# Patient Record
Sex: FEMALE | Race: WHITE | NOT HISPANIC OR LATINO | URBAN - METROPOLITAN AREA
[De-identification: names, ages, dates, MRNs, and addresses within clinical notes are randomized per-mention and may not be internally consistent; named-entity substitution may affect disease eponyms.]

---

## 2017-07-28 ENCOUNTER — EMERGENCY (EMERGENCY)
Facility: HOSPITAL | Age: 72
LOS: 1 days | Discharge: HOME | End: 2017-07-28
Attending: EMERGENCY MEDICINE | Admitting: EMERGENCY MEDICINE
Payer: MEDICARE

## 2017-07-28 VITALS
DIASTOLIC BLOOD PRESSURE: 84 MMHG | HEART RATE: 74 BPM | SYSTOLIC BLOOD PRESSURE: 132 MMHG | RESPIRATION RATE: 16 BRPM | TEMPERATURE: 98 F | OXYGEN SATURATION: 98 %

## 2017-07-28 VITALS
HEART RATE: 74 BPM | OXYGEN SATURATION: 98 % | TEMPERATURE: 98 F | HEIGHT: 63 IN | RESPIRATION RATE: 14 BRPM | WEIGHT: 143.08 LBS

## 2017-07-28 DIAGNOSIS — O00.9 ECTOPIC PREGNANCY, UNSPECIFIED: Chronic | ICD-10-CM

## 2017-07-28 DIAGNOSIS — E11.9 TYPE 2 DIABETES MELLITUS WITHOUT COMPLICATIONS: ICD-10-CM

## 2017-07-28 DIAGNOSIS — Z96.60 PRESENCE OF UNSPECIFIED ORTHOPEDIC JOINT IMPLANT: Chronic | ICD-10-CM

## 2017-07-28 DIAGNOSIS — Z98.51 TUBAL LIGATION STATUS: Chronic | ICD-10-CM

## 2017-07-28 DIAGNOSIS — L03.116 CELLULITIS OF LEFT LOWER LIMB: ICD-10-CM

## 2017-07-28 DIAGNOSIS — I10 ESSENTIAL (PRIMARY) HYPERTENSION: ICD-10-CM

## 2017-07-28 DIAGNOSIS — Z98.89 OTHER SPECIFIED POSTPROCEDURAL STATES: Chronic | ICD-10-CM

## 2017-07-28 DIAGNOSIS — Z85.048 PERSONAL HISTORY OF OTHER MALIGNANT NEOPLASM OF RECTUM, RECTOSIGMOID JUNCTION, AND ANUS: ICD-10-CM

## 2017-07-28 LAB
ALBUMIN SERPL ELPH-MCNC: 3.7 G/DL — SIGNIFICANT CHANGE UP (ref 3.3–5)
ALP SERPL-CCNC: 69 U/L — SIGNIFICANT CHANGE UP (ref 40–120)
ALT FLD-CCNC: 27 U/L — SIGNIFICANT CHANGE UP (ref 12–78)
ANION GAP SERPL CALC-SCNC: 8 MMOL/L — SIGNIFICANT CHANGE UP (ref 5–17)
AST SERPL-CCNC: 21 U/L — SIGNIFICANT CHANGE UP (ref 15–37)
BASOPHILS NFR BLD AUTO: 1 % — SIGNIFICANT CHANGE UP (ref 0–2)
BILIRUB SERPL-MCNC: 0.2 MG/DL — SIGNIFICANT CHANGE UP (ref 0.2–1.2)
BUN SERPL-MCNC: 31 MG/DL — HIGH (ref 7–23)
CALCIUM SERPL-MCNC: 8.9 MG/DL — SIGNIFICANT CHANGE UP (ref 8.5–10.1)
CHLORIDE SERPL-SCNC: 105 MMOL/L — SIGNIFICANT CHANGE UP (ref 96–108)
CO2 SERPL-SCNC: 28 MMOL/L — SIGNIFICANT CHANGE UP (ref 22–31)
CREAT SERPL-MCNC: 1.3 MG/DL — SIGNIFICANT CHANGE UP (ref 0.5–1.3)
EOSINOPHIL NFR BLD AUTO: 2 % — SIGNIFICANT CHANGE UP (ref 0–6)
ERYTHROCYTE [SEDIMENTATION RATE] IN BLOOD: 20 MM/HR — SIGNIFICANT CHANGE UP (ref 0–20)
GLUCOSE SERPL-MCNC: 190 MG/DL — HIGH (ref 70–99)
HCT VFR BLD CALC: 35.1 % — SIGNIFICANT CHANGE UP (ref 34.5–45)
HGB BLD-MCNC: 11.7 G/DL — SIGNIFICANT CHANGE UP (ref 11.5–15.5)
LYMPHOCYTES # BLD AUTO: 18 % — SIGNIFICANT CHANGE UP (ref 13–44)
MCHC RBC-ENTMCNC: 30 PG — SIGNIFICANT CHANGE UP (ref 27–34)
MCHC RBC-ENTMCNC: 33.2 GM/DL — SIGNIFICANT CHANGE UP (ref 32–36)
MCV RBC AUTO: 90.3 FL — SIGNIFICANT CHANGE UP (ref 80–100)
MONOCYTES NFR BLD AUTO: 4 % — SIGNIFICANT CHANGE UP (ref 1–9)
NEUTROPHILS NFR BLD AUTO: 75 % — SIGNIFICANT CHANGE UP (ref 43–77)
PLATELET # BLD AUTO: 192 K/UL — SIGNIFICANT CHANGE UP (ref 150–400)
POTASSIUM SERPL-MCNC: 4.2 MMOL/L — SIGNIFICANT CHANGE UP (ref 3.5–5.3)
POTASSIUM SERPL-SCNC: 4.2 MMOL/L — SIGNIFICANT CHANGE UP (ref 3.5–5.3)
PROT SERPL-MCNC: 6.8 G/DL — SIGNIFICANT CHANGE UP (ref 6–8.3)
RBC # BLD: 3.89 M/UL — SIGNIFICANT CHANGE UP (ref 3.8–5.2)
RBC # FLD: 12.2 % — SIGNIFICANT CHANGE UP (ref 10.3–14.5)
SODIUM SERPL-SCNC: 141 MMOL/L — SIGNIFICANT CHANGE UP (ref 135–145)
WBC # BLD: 7.7 K/UL — SIGNIFICANT CHANGE UP (ref 3.8–10.5)
WBC # FLD AUTO: 7.7 K/UL — SIGNIFICANT CHANGE UP (ref 3.8–10.5)

## 2017-07-28 PROCEDURE — 85652 RBC SED RATE AUTOMATED: CPT

## 2017-07-28 PROCEDURE — 80053 COMPREHEN METABOLIC PANEL: CPT

## 2017-07-28 PROCEDURE — 85027 COMPLETE CBC AUTOMATED: CPT

## 2017-07-28 PROCEDURE — 96365 THER/PROPH/DIAG IV INF INIT: CPT

## 2017-07-28 PROCEDURE — 99284 EMERGENCY DEPT VISIT MOD MDM: CPT | Mod: 25

## 2017-07-28 PROCEDURE — 99284 EMERGENCY DEPT VISIT MOD MDM: CPT

## 2017-07-28 RX ORDER — CEFOXITIN 1 G/1
1 INJECTION, POWDER, FOR SOLUTION INTRAVENOUS
Qty: 20 | Refills: 0
Start: 2017-07-28 | End: 2017-08-07

## 2017-07-28 RX ORDER — MUPIROCIN 20 MG/G
1 OINTMENT TOPICAL
Qty: 1 | Refills: 0
Start: 2017-07-28 | End: 2017-08-07

## 2017-07-28 RX ORDER — CEFTRIAXONE 500 MG/1
1 INJECTION, POWDER, FOR SOLUTION INTRAMUSCULAR; INTRAVENOUS ONCE
Qty: 0 | Refills: 0 | Status: COMPLETED | OUTPATIENT
Start: 2017-07-28 | End: 2017-07-28

## 2017-07-28 RX ADMIN — CEFTRIAXONE 100 GRAM(S): 500 INJECTION, POWDER, FOR SOLUTION INTRAMUSCULAR; INTRAVENOUS at 13:00

## 2017-07-28 NOTE — ED ADULT NURSE NOTE - OBJECTIVE STATEMENT
Pt arrived ambulatory for left foot pain. Pt states she had a small cut on her foot for a week, and then 2 to 3 days ago it became very inflamed and painful. Pt denies any bleeding or discharge from the cut. Pt reports that pain is now radiating to the top of the foot, and it is very painful to walk on. Redness present around the area, pedal pulses palpable. Denies any numbness, tingling, fever, chills.

## 2017-07-28 NOTE — ED PROVIDER NOTE - CHPI ED SYMPTOMS NEG
no chills/no fever/no bleeding/no redness/no drainage/no pain/no purulent drainage/no bleeding at site

## 2017-07-28 NOTE — ED ADULT NURSE NOTE - PSH
Ectopic pregnancy    S/P hip replacement  right hip replacement  S/P tonsillectomy    Tubal ligation status

## 2017-07-28 NOTE — ED ADULT NURSE NOTE - PMH
Anal cancer    DM (diabetes mellitus)    Dysphagia    HTN (hypertension)    Hyperlipidemia    Osteoarthritis

## 2017-07-28 NOTE — ED PROVIDER NOTE - OBJECTIVE STATEMENT
came in ambulatory apparently accidentally cut left foot couple of days notice the past 2 days redness but no significant swelling went to PMD Dr Musa who send her to podiatry in turn send her to our er. Patient has history of DM HTN

## 2021-04-28 ENCOUNTER — INPATIENT (INPATIENT)
Facility: HOSPITAL | Age: 76
LOS: 1 days | Discharge: HOME | DRG: 69 | End: 2021-04-30
Attending: FAMILY MEDICINE | Admitting: FAMILY MEDICINE
Payer: MEDICARE

## 2021-04-28 VITALS
SYSTOLIC BLOOD PRESSURE: 199 MMHG | RESPIRATION RATE: 16 BRPM | TEMPERATURE: 98 F | DIASTOLIC BLOOD PRESSURE: 82 MMHG | OXYGEN SATURATION: 98 % | HEIGHT: 63 IN | HEART RATE: 77 BPM

## 2021-04-28 DIAGNOSIS — Z98.51 TUBAL LIGATION STATUS: Chronic | ICD-10-CM

## 2021-04-28 DIAGNOSIS — Z96.60 PRESENCE OF UNSPECIFIED ORTHOPEDIC JOINT IMPLANT: Chronic | ICD-10-CM

## 2021-04-28 DIAGNOSIS — Z98.89 OTHER SPECIFIED POSTPROCEDURAL STATES: Chronic | ICD-10-CM

## 2021-04-28 DIAGNOSIS — R41.82 ALTERED MENTAL STATUS, UNSPECIFIED: ICD-10-CM

## 2021-04-28 DIAGNOSIS — O00.9 ECTOPIC PREGNANCY, UNSPECIFIED: Chronic | ICD-10-CM

## 2021-04-28 LAB
ALBUMIN SERPL ELPH-MCNC: 3.6 G/DL — SIGNIFICANT CHANGE UP (ref 3.3–5)
ALP SERPL-CCNC: 113 U/L — SIGNIFICANT CHANGE UP (ref 40–120)
ALT FLD-CCNC: 38 U/L — SIGNIFICANT CHANGE UP (ref 12–78)
ANION GAP SERPL CALC-SCNC: 2 MMOL/L — LOW (ref 5–17)
APPEARANCE UR: CLEAR — SIGNIFICANT CHANGE UP
APTT BLD: 29.4 SEC — SIGNIFICANT CHANGE UP (ref 27.5–35.5)
AST SERPL-CCNC: 27 U/L — SIGNIFICANT CHANGE UP (ref 15–37)
BASOPHILS # BLD AUTO: 0.02 K/UL — SIGNIFICANT CHANGE UP (ref 0–0.2)
BASOPHILS NFR BLD AUTO: 0.3 % — SIGNIFICANT CHANGE UP (ref 0–2)
BILIRUB SERPL-MCNC: 0.2 MG/DL — SIGNIFICANT CHANGE UP (ref 0.2–1.2)
BILIRUB UR-MCNC: NEGATIVE — SIGNIFICANT CHANGE UP
BUN SERPL-MCNC: 29 MG/DL — HIGH (ref 7–23)
CALCIUM SERPL-MCNC: 8 MG/DL — LOW (ref 8.5–10.1)
CHLORIDE SERPL-SCNC: 104 MMOL/L — SIGNIFICANT CHANGE UP (ref 96–108)
CO2 SERPL-SCNC: 32 MMOL/L — HIGH (ref 22–31)
COLOR SPEC: SIGNIFICANT CHANGE UP
CREAT SERPL-MCNC: 1.4 MG/DL — HIGH (ref 0.5–1.3)
DIFF PNL FLD: NEGATIVE — SIGNIFICANT CHANGE UP
EOSINOPHIL # BLD AUTO: 0.13 K/UL — SIGNIFICANT CHANGE UP (ref 0–0.5)
EOSINOPHIL NFR BLD AUTO: 1.9 % — SIGNIFICANT CHANGE UP (ref 0–6)
GLUCOSE SERPL-MCNC: 168 MG/DL — HIGH (ref 70–99)
GLUCOSE UR QL: NEGATIVE — SIGNIFICANT CHANGE UP
HCT VFR BLD CALC: 35.1 % — SIGNIFICANT CHANGE UP (ref 34.5–45)
HGB BLD-MCNC: 11.5 G/DL — SIGNIFICANT CHANGE UP (ref 11.5–15.5)
IMM GRANULOCYTES NFR BLD AUTO: 0.3 % — SIGNIFICANT CHANGE UP (ref 0–1.5)
INR BLD: 1.01 RATIO — SIGNIFICANT CHANGE UP (ref 0.88–1.16)
KETONES UR-MCNC: NEGATIVE — SIGNIFICANT CHANGE UP
LACTATE SERPL-SCNC: 0.5 MMOL/L — LOW (ref 0.7–2)
LEUKOCYTE ESTERASE UR-ACNC: NEGATIVE — SIGNIFICANT CHANGE UP
LYMPHOCYTES # BLD AUTO: 0.96 K/UL — LOW (ref 1–3.3)
LYMPHOCYTES # BLD AUTO: 14 % — SIGNIFICANT CHANGE UP (ref 13–44)
MCHC RBC-ENTMCNC: 29.1 PG — SIGNIFICANT CHANGE UP (ref 27–34)
MCHC RBC-ENTMCNC: 32.8 GM/DL — SIGNIFICANT CHANGE UP (ref 32–36)
MCV RBC AUTO: 88.9 FL — SIGNIFICANT CHANGE UP (ref 80–100)
MONOCYTES # BLD AUTO: 0.64 K/UL — SIGNIFICANT CHANGE UP (ref 0–0.9)
MONOCYTES NFR BLD AUTO: 9.3 % — SIGNIFICANT CHANGE UP (ref 2–14)
NEUTROPHILS # BLD AUTO: 5.1 K/UL — SIGNIFICANT CHANGE UP (ref 1.8–7.4)
NEUTROPHILS NFR BLD AUTO: 74.2 % — SIGNIFICANT CHANGE UP (ref 43–77)
NITRITE UR-MCNC: NEGATIVE — SIGNIFICANT CHANGE UP
NRBC # BLD: 0 /100 WBCS — SIGNIFICANT CHANGE UP (ref 0–0)
PH UR: 7 — SIGNIFICANT CHANGE UP (ref 5–8)
PLATELET # BLD AUTO: 197 K/UL — SIGNIFICANT CHANGE UP (ref 150–400)
POTASSIUM SERPL-MCNC: 4.1 MMOL/L — SIGNIFICANT CHANGE UP (ref 3.5–5.3)
POTASSIUM SERPL-SCNC: 4.1 MMOL/L — SIGNIFICANT CHANGE UP (ref 3.5–5.3)
PROT SERPL-MCNC: 6.7 G/DL — SIGNIFICANT CHANGE UP (ref 6–8.3)
PROT UR-MCNC: 15
PROTHROM AB SERPL-ACNC: 11.8 SEC — SIGNIFICANT CHANGE UP (ref 10.6–13.6)
RBC # BLD: 3.95 M/UL — SIGNIFICANT CHANGE UP (ref 3.8–5.2)
RBC # FLD: 12.8 % — SIGNIFICANT CHANGE UP (ref 10.3–14.5)
SARS-COV-2 RNA SPEC QL NAA+PROBE: SIGNIFICANT CHANGE UP
SODIUM SERPL-SCNC: 138 MMOL/L — SIGNIFICANT CHANGE UP (ref 135–145)
SP GR SPEC: 1 — LOW (ref 1.01–1.02)
TSH SERPL-MCNC: 1.91 UIU/ML — SIGNIFICANT CHANGE UP (ref 0.36–3.74)
UROBILINOGEN FLD QL: NEGATIVE — SIGNIFICANT CHANGE UP
WBC # BLD: 6.87 K/UL — SIGNIFICANT CHANGE UP (ref 3.8–10.5)
WBC # FLD AUTO: 6.87 K/UL — SIGNIFICANT CHANGE UP (ref 3.8–10.5)

## 2021-04-28 PROCEDURE — 99285 EMERGENCY DEPT VISIT HI MDM: CPT | Mod: CS

## 2021-04-28 PROCEDURE — 0042T: CPT

## 2021-04-28 PROCEDURE — 71045 X-RAY EXAM CHEST 1 VIEW: CPT | Mod: 26

## 2021-04-28 PROCEDURE — 70498 CT ANGIOGRAPHY NECK: CPT | Mod: 26,MA

## 2021-04-28 PROCEDURE — 70496 CT ANGIOGRAPHY HEAD: CPT | Mod: 26,MA

## 2021-04-28 RX ORDER — ACETAMINOPHEN 500 MG
650 TABLET ORAL EVERY 6 HOURS
Refills: 0 | Status: DISCONTINUED | OUTPATIENT
Start: 2021-04-28 | End: 2021-04-30

## 2021-04-28 RX ORDER — ASPIRIN/CALCIUM CARB/MAGNESIUM 324 MG
325 TABLET ORAL DAILY
Refills: 0 | Status: DISCONTINUED | OUTPATIENT
Start: 2021-04-28 | End: 2021-04-28

## 2021-04-28 RX ORDER — DEXTROSE 50 % IN WATER 50 %
25 SYRINGE (ML) INTRAVENOUS ONCE
Refills: 0 | Status: DISCONTINUED | OUTPATIENT
Start: 2021-04-28 | End: 2021-04-30

## 2021-04-28 RX ORDER — ENOXAPARIN SODIUM 100 MG/ML
34 INJECTION SUBCUTANEOUS
Qty: 0 | Refills: 0 | DISCHARGE

## 2021-04-28 RX ORDER — INSULIN LISPRO 100/ML
VIAL (ML) SUBCUTANEOUS AT BEDTIME
Refills: 0 | Status: DISCONTINUED | OUTPATIENT
Start: 2021-04-28 | End: 2021-04-30

## 2021-04-28 RX ORDER — INSULIN LISPRO 100/ML
VIAL (ML) SUBCUTANEOUS
Refills: 0 | Status: DISCONTINUED | OUTPATIENT
Start: 2021-04-28 | End: 2021-04-30

## 2021-04-28 RX ORDER — GLUCAGON INJECTION, SOLUTION 0.5 MG/.1ML
1 INJECTION, SOLUTION SUBCUTANEOUS ONCE
Refills: 0 | Status: DISCONTINUED | OUTPATIENT
Start: 2021-04-28 | End: 2021-04-30

## 2021-04-28 RX ORDER — INSULIN NPH HUM/REG INSULIN HM 70-30/ML
0 VIAL (ML) SUBCUTANEOUS
Qty: 0 | Refills: 0 | DISCHARGE

## 2021-04-28 RX ORDER — LABETALOL HCL 100 MG
20 TABLET ORAL ONCE
Refills: 0 | Status: COMPLETED | OUTPATIENT
Start: 2021-04-28 | End: 2021-04-28

## 2021-04-28 RX ORDER — METFORMIN HYDROCHLORIDE 850 MG/1
2 TABLET ORAL
Qty: 0 | Refills: 0 | DISCHARGE

## 2021-04-28 RX ORDER — DEXTROSE 50 % IN WATER 50 %
15 SYRINGE (ML) INTRAVENOUS ONCE
Refills: 0 | Status: DISCONTINUED | OUTPATIENT
Start: 2021-04-28 | End: 2021-04-30

## 2021-04-28 RX ORDER — SODIUM CHLORIDE 9 MG/ML
2000 INJECTION INTRAMUSCULAR; INTRAVENOUS; SUBCUTANEOUS ONCE
Refills: 0 | Status: COMPLETED | OUTPATIENT
Start: 2021-04-28 | End: 2021-04-28

## 2021-04-28 RX ORDER — HYDRALAZINE HCL 50 MG
10 TABLET ORAL ONCE
Refills: 0 | Status: COMPLETED | OUTPATIENT
Start: 2021-04-28 | End: 2021-04-28

## 2021-04-28 RX ORDER — ESCITALOPRAM OXALATE 10 MG/1
1 TABLET, FILM COATED ORAL
Qty: 0 | Refills: 0 | DISCHARGE

## 2021-04-28 RX ORDER — HYDRALAZINE HCL 50 MG
10 TABLET ORAL EVERY 8 HOURS
Refills: 0 | Status: DISCONTINUED | OUTPATIENT
Start: 2021-04-28 | End: 2021-04-30

## 2021-04-28 RX ORDER — HEPARIN SODIUM 5000 [USP'U]/ML
5000 INJECTION INTRAVENOUS; SUBCUTANEOUS EVERY 12 HOURS
Refills: 0 | Status: DISCONTINUED | OUTPATIENT
Start: 2021-04-28 | End: 2021-04-29

## 2021-04-28 RX ORDER — ESCITALOPRAM OXALATE 10 MG/1
10 TABLET, FILM COATED ORAL DAILY
Refills: 0 | Status: DISCONTINUED | OUTPATIENT
Start: 2021-04-28 | End: 2021-04-28

## 2021-04-28 RX ORDER — AMLODIPINE BESYLATE 2.5 MG/1
10 TABLET ORAL EVERY 24 HOURS
Refills: 0 | Status: DISCONTINUED | OUTPATIENT
Start: 2021-04-28 | End: 2021-04-28

## 2021-04-28 RX ORDER — INSULIN GLARGINE 100 [IU]/ML
35 INJECTION, SOLUTION SUBCUTANEOUS AT BEDTIME
Refills: 0 | Status: DISCONTINUED | OUTPATIENT
Start: 2021-04-28 | End: 2021-04-30

## 2021-04-28 RX ORDER — ATORVASTATIN CALCIUM 80 MG/1
80 TABLET, FILM COATED ORAL AT BEDTIME
Refills: 0 | Status: DISCONTINUED | OUTPATIENT
Start: 2021-04-28 | End: 2021-04-28

## 2021-04-28 RX ORDER — ALENDRONATE SODIUM 70 MG/1
1 TABLET ORAL
Qty: 0 | Refills: 0 | DISCHARGE

## 2021-04-28 RX ORDER — DEXTROSE 50 % IN WATER 50 %
12.5 SYRINGE (ML) INTRAVENOUS ONCE
Refills: 0 | Status: DISCONTINUED | OUTPATIENT
Start: 2021-04-28 | End: 2021-04-30

## 2021-04-28 RX ORDER — LISINOPRIL 2.5 MG/1
1 TABLET ORAL
Qty: 0 | Refills: 0 | DISCHARGE

## 2021-04-28 RX ORDER — METOPROLOL TARTRATE 50 MG
25 TABLET ORAL
Refills: 0 | Status: DISCONTINUED | OUTPATIENT
Start: 2021-04-28 | End: 2021-04-28

## 2021-04-28 RX ADMIN — SODIUM CHLORIDE 2000 MILLILITER(S): 9 INJECTION INTRAMUSCULAR; INTRAVENOUS; SUBCUTANEOUS at 17:55

## 2021-04-28 RX ADMIN — Medication 10 MILLIGRAM(S): at 19:42

## 2021-04-28 RX ADMIN — HEPARIN SODIUM 5000 UNIT(S): 5000 INJECTION INTRAVENOUS; SUBCUTANEOUS at 23:05

## 2021-04-28 RX ADMIN — Medication 20 MILLIGRAM(S): at 18:59

## 2021-04-28 RX ADMIN — Medication 10 MILLIGRAM(S): at 23:05

## 2021-04-28 RX ADMIN — Medication 650 MILLIGRAM(S): at 23:38

## 2021-04-28 RX ADMIN — INSULIN GLARGINE 35 UNIT(S): 100 INJECTION, SOLUTION SUBCUTANEOUS at 23:05

## 2021-04-28 NOTE — ED PROVIDER NOTE - PROGRESS NOTE DETAILS
pt remains stable with confused speech.   made aware of results.  CT, CTA and CT perfusion with no evidence of CVA.  Discussed TPA, however,  is unsure.  Made aware of time constraint.  Neuro, Dr. Aquino, consulted Family declines TPA.  Dr. Aquino aware and recommends admission and MRI tomorrow

## 2021-04-28 NOTE — ED PROVIDER NOTE - OBJECTIVE STATEMENT
76 y/o F with hx of HTN, HLD, DM with AMS since approx 1:30 >3 hrs ago witnessed by .  pt unable to provide any hx due to confused speech.   states pt was having difficulty walking yesterday and today and had decreased PO intake.  Pt and  were outside working in the yard when pt became more confused.   thought it was due to blood sugar and gave pt something to drink.  pt with clear speech but difficult with word finding.  no blood thinners.  no hx of stroke    PCP: Joe Leslie

## 2021-04-28 NOTE — ED ADULT NURSE NOTE - OBJECTIVE STATEMENT
Patient is a 74yo female alerted and oriented x1 Patient is normally alerted and oriented x3 but has moment lapses into confusion. This particular lapse started at 1400. Patient family states that she has had altered mental status and weakness with difficulty ambulating

## 2021-04-28 NOTE — ED PROVIDER NOTE - ENMT NEGATIVE STATEMENT, MLM
3-5x/week Ears: no ear pain and no hearing problems. Nose: no nasal congestion and no nasal drainage. Mouth/Throat: no dysphagia, no hoarseness and no throat pain. Neck: no lumps, no pain, no stiffness and no swollen glands.

## 2021-04-28 NOTE — CHART NOTE - NSCHARTNOTEFT_GEN_A_CORE
Called by RN for new fever T 102.5F. Patient seen and evaluated at the bedside. Patient here for AMS, presented with aphasia, which has not changed interim.     Vital Signs Last 24 Hrs  T(C): 39.2 (28 Apr 2021 22:59), Max: 39.2 (28 Apr 2021 22:59)  T(F): 102.5 (28 Apr 2021 22:59), Max: 102.5 (28 Apr 2021 22:59)  HR: 76 (28 Apr 2021 22:59) (71 - 84)  BP: 219/93 (28 Apr 2021 22:59) (193/79 - 232/100)  RR: 22 (28 Apr 2021 22:59) (16 - 22)  SpO2: 98% (28 Apr 2021 22:59) (97% - 98%)    Physical Exam:  General:   HEENT:   Neck: Supple, nontender  CV: RRR, +S1/S2, no murmurs, rubs or gallops  Respiratory: CTA B/L, No W/R/R  Abdominal: Soft, NT, ND +BSx4  Extremities: No C/C/E, + peripheral pulses  Neurology: Awake,     A/P: 74 y/o female with PMHx HTN, HLD, DMT2 presented to ED earlier today with AMS, aphasia witnessed by  at home, admitted for HTN urgency and AMS workup, now with new onset fever   - New fever workup CBC w/ diff, CMP, lactate, procal, blood cultures x2  - UA on admission grossly clear, will order urine culture  - Fever possibly 2/2 aspiration event in setting of AMS vs CVA  - Further orders pending results of above Called by RN for new fever T 102.5F. Patient seen and evaluated at the bedside. Patient here for AMS, presented with aphasia, which has not changed interim.     Vital Signs Last 24 Hrs  T(C): 39.2 (28 Apr 2021 22:59), Max: 39.2 (28 Apr 2021 22:59)  T(F): 102.5 (28 Apr 2021 22:59), Max: 102.5 (28 Apr 2021 22:59)  HR: 76 (28 Apr 2021 22:59) (71 - 84)  BP: 219/93 (28 Apr 2021 22:59) (193/79 - 232/100)  RR: 22 (28 Apr 2021 22:59) (16 - 22)  SpO2: 98% (28 Apr 2021 22:59) (97% - 98%)    Physical Exam:  General:   HEENT:   Neck: Supple, nontender  CV: RRR, +S1/S2, no murmurs, rubs or gallops  Respiratory: CTA B/L, No W/R/R  Abdominal: Soft, NT, ND +BSx4  Extremities: No C/C/E, + peripheral pulses  Neurology: Awake,     A/P: 74 y/o female with PMHx HTN, HLD, DMT2 presented to ED earlier today with AMS, aphasia witnessed by  at home, admitted for HTN urgency and AMS workup, now with new onset fever   - New fever workup CBC w/ diff, CMP, lactate, procal, blood cultures x2, UA, urine culture  - Fever possibly 2/2 aspiration event in setting of AMS vs CVA  - Further orders pending results of above Called by RN for new fever T 102.5F. Patient seen and evaluated at the bedside. Patient here for AMS, presented with aphasia, which has not changed interim.     Vital Signs Last 24 Hrs  T(C): 39.2 (28 Apr 2021 22:59), Max: 39.2 (28 Apr 2021 22:59)  T(F): 102.5 (28 Apr 2021 22:59), Max: 102.5 (28 Apr 2021 22:59)  HR: 76 (28 Apr 2021 22:59) (71 - 84)  BP: 219/93 (28 Apr 2021 22:59) (193/79 - 232/100)  RR: 22 (28 Apr 2021 22:59) (16 - 22)  SpO2: 98% (28 Apr 2021 22:59) (97% - 98%)    Physical Exam:  General: Appears NAD  HEENT: NC/AT, EOMI b/l, moist mucous membranes   Neck: Supple, nontender  CV: RRR, +S1/S2, no murmurs, rubs or gallops  Respiratory: CTA B/L, no W/R/R  Abdominal: Soft, NTND, +bowel sounds present x4 quadrants   Extremities: No C/C/E, 2+ peripheral pulses  Neurology: Awake, does not follow commands, nonverbal, moves all 4 ext     A/P: 74 y/o female with PMHx HTN, HLD, DMT2 presented to ED earlier today with AMS, aphasia witnessed by  at home, admitted for HTN urgency and AMS workup, now with new onset fever   - New fever workup CBC w/ diff, CMP, lactate, procal, blood cultures x2, UA, urine culture  - Fever possibly 2/2 CVA vs aspiration event in setting of AMS  - Further orders pending results of above Called by RN for new fever T 102.5F. Patient seen and evaluated at the bedside. Patient here for AMS, presented with aphasia, which has not changed interim.     Vital Signs Last 24 Hrs  T(C): 39.2 (28 Apr 2021 22:59), Max: 39.2 (28 Apr 2021 22:59)  T(F): 102.5 (28 Apr 2021 22:59), Max: 102.5 (28 Apr 2021 22:59)  HR: 76 (28 Apr 2021 22:59) (71 - 84)  BP: 219/93 (28 Apr 2021 22:59) (193/79 - 232/100)  RR: 22 (28 Apr 2021 22:59) (16 - 22)  SpO2: 98% (28 Apr 2021 22:59) (97% - 98%)    Physical Exam:  General: Appears NAD  HEENT: NC/AT, PERRLA, moist mucous membranes   Neck: Supple, nontender  CV: RRR, +S1/S2, no murmurs, rubs or gallops  Respiratory: CTA B/L, no W/R/R  Abdominal: Soft, NTND, +bowel sounds present x4 quadrants   Extremities: No C/C/E, 2+ peripheral pulses  Neurology: Awake, does not follow commands, nonverbal, moves all 4 ext     A/P: 76 y/o female with PMHx HTN, HLD, DMT2 presented to ED earlier today with AMS, aphasia witnessed by  at home, admitted for HTN urgency and AMS workup, now with new onset fever   - New fever workup CBC w/ diff, CMP, lactate, procal, blood cultures x2, UA, urine culture  - Fever possibly 2/2 CVA vs aspiration event in setting of AMS  - Further orders pending results of above

## 2021-04-28 NOTE — H&P ADULT - HISTORY OF PRESENT ILLNESS
Chart, labs and XR reports reviewed. Chart, labs and XR reports reviewed.  ADITYA CHAVEZ is a 75y Female brought to ED AMS since 1:30 PM  >3 hrs ago witnessed by .  Patient unable to provide any histrory due to confusion.   states she was having difficulty in walking yesterday and today and she had decreased oral intake.  Patient and  were outside working in the yard when patient became more confused.   thought it was due to blood sugar and gave her something to drink.  Patient with clear speech but difficult with word finding.  Not on blood thinners.  No headache no nausea or vomiting.

## 2021-04-28 NOTE — H&P ADULT - NEGATIVE NEUROLOGICAL SYMPTOMS
no transient paralysis/no generalized seizures/no focal seizures/no syncope/no tremors/no headache/no loss of consciousness/no hemiparesis

## 2021-04-28 NOTE — H&P ADULT - NSICDXPASTSURGICALHX_GEN_ALL_CORE_FT
PAST SURGICAL HISTORY:  Ectopic pregnancy     S/P hip replacement right hip replacement    S/P tonsillectomy     Tubal ligation status

## 2021-04-28 NOTE — H&P ADULT - NSHPLABSRESULTS_GEN_ALL_CORE
11.5   6.87  )-----------( 197      ( 2021 17:40 )             35.1     2021 17:40    138    |  104    |  29     ----------------------------<  168    4.1     |  32     |  1.40     Ca    8.0        2021 17:40    TPro  6.7    /  Alb  3.6    /  TBili  0.2    /  DBili  x      /  AST  27     /  ALT  38     /  AlkPhos  113    2021 17:40    LIVER FUNCTIONS - ( 2021 17:40 )  Alb: 3.6 g/dL / Pro: 6.7 g/dL / ALK PHOS: 113 U/L / ALT: 38 U/L / AST: 27 U/L / GGT: x           PT/INR - ( 2021 17:40 )   PT: 11.8 sec;   INR: 1.01 ratio      PTT - ( 2021 17:40 )  PTT:29.4 sec  CAPILLARY BLOOD GLUCOSE    POCT Blood Glucose.: 199 mg/dL (2021 16:37)  POCT Blood Glucose.: 181 mg/dL (2021 16:14)    Urinalysis Basic - ( 2021 20:19 )    Color: Pale Yellow / Appearance: Clear / S.005 / pH: x  Gluc: x / Ketone: Negative  / Bili: Negative / Urobili: Negative   Blood: x / Protein: 15 / Nitrite: Negative   Leuk Esterase: Negative / RBC: 0-2 /HPF / WBC 0-2   Sq Epi: x / Non Sq Epi: Few / Bacteria: Occasional    < from: CT Brain Stroke Protocol (21 @ 16:58) >    HEAD CT: No acute intracranial hemorrhage or acute territorial infarction.    CT PERFUSION demonstrated: No asymmetric or territorial perfusion abnormality.    If symptoms persist consider follow up head CT or MRI, MRA  if no contraindication.    CTA COW:  Patent intracranial circulation without flow limiting stenosis.    CTA NECK: Moderate stenosis of the right internal carotid artery.    < end of copied text >    < from: CT Angio Neck w/ IV Cont (21 @ 17:09) >    HEAD CT AND RAPID PERFUSION:    HEAD CT: No acute intracranial hemorrhage or acute territorial infarction.    CT PERFUSION demonstrated: No asymmetric or territorial perfusion abnormality.    If symptoms persist consider follow up head CT or MRI, MRA  if no contraindication.    CTA COW:  Patent intracranial circulation without flow limiting stenosis.    CTA NECK: Moderate stenosis of the right internal carotid artery.      < end of copied text >

## 2021-04-28 NOTE — ED ADULT NURSE NOTE - NSIMPLEMENTINTERV_GEN_ALL_ED
Implemented All Fall with Harm Risk Interventions:  [place] to call system. Call bell, personal items and telephone within reach. Instruct patient to call for assistance. Room bathroom lighting operational. Non-slip footwear when patient is off stretcher. Physically safe environment: no spills, clutter or unnecessary equipment. Stretcher in lowest position, wheels locked, appropriate side rails in place. Provide visual cue, wrist band, yellow gown, etc. Monitor gait and stability. Monitor for mental status changes and reorient to person, place, and time. Review medications for side effects contributing to fall risk. Reinforce activity limits and safety measures with patient and family. Provide visual clues: red socks.

## 2021-04-28 NOTE — H&P ADULT - NSICDXFAMILYHX_GEN_ALL_CORE_FT
FAMILY HISTORY:  Father  Still living? No  Family history of prostate cancer, Age at diagnosis: Age Unknown    Mother  Still living? No  Family history of diabetes mellitus (DM), Age at diagnosis: Age Unknown  Family history of hypertension, Age at diagnosis: Age Unknown  Family history of prostate cancer, Age at diagnosis: Age Unknown

## 2021-04-28 NOTE — ED ADULT TRIAGE NOTE - CHIEF COMPLAINT QUOTE
pt from home, working in yard for a few hours,  reports ams starting at 1400, confused conversation, generalized weakness,  reports not eating and drinking well,  ems glucose 235, no facial asymmetry noted, hands equal , speech clear

## 2021-04-28 NOTE — ED ADULT NURSE NOTE - NSSUHOSCREENINGYN_ED_ALL_ED
Pt denies allergy, rx sent to pharm   No - the patient is unable to be screened due to medical condition

## 2021-04-28 NOTE — ED ADULT NURSE REASSESSMENT NOTE - NS ED NURSE REASSESS COMMENT FT1
Pt received lying on stretcher with family at bedside. Pt is restless, unable to fully express herself, moving all extremities with equal strength. Pt noted with >300 ml on bladder scan, d/w Dr. Mckeon, urinary catheter inserted with return of pale yellow urine

## 2021-04-28 NOTE — H&P ADULT - NSICDXPASTMEDICALHX_GEN_ALL_CORE_FT
PAST MEDICAL HISTORY:  Anal cancer     DM (diabetes mellitus)     Dysphagia     HTN (hypertension)     Hyperlipidemia     Osteoarthritis

## 2021-04-28 NOTE — ED PROVIDER NOTE - CARE PLAN
Principal Discharge DX:	Altered mental status, unspecified altered mental status type   Principal Discharge DX:	Altered mental status, unspecified altered mental status type  Secondary Diagnosis:	Hypertensive urgency

## 2021-04-29 DIAGNOSIS — I10 ESSENTIAL (PRIMARY) HYPERTENSION: ICD-10-CM

## 2021-04-29 DIAGNOSIS — R41.82 ALTERED MENTAL STATUS, UNSPECIFIED: ICD-10-CM

## 2021-04-29 DIAGNOSIS — E11.65 TYPE 2 DIABETES MELLITUS WITH HYPERGLYCEMIA: ICD-10-CM

## 2021-04-29 LAB
A1C WITH ESTIMATED AVERAGE GLUCOSE RESULT: 7.7 % — HIGH (ref 4–5.6)
ALBUMIN SERPL ELPH-MCNC: 4 G/DL — SIGNIFICANT CHANGE UP (ref 3.3–5)
ALP SERPL-CCNC: 82 U/L — SIGNIFICANT CHANGE UP (ref 40–120)
ALT FLD-CCNC: 35 U/L — SIGNIFICANT CHANGE UP (ref 12–78)
ANION GAP SERPL CALC-SCNC: 9 MMOL/L — SIGNIFICANT CHANGE UP (ref 5–17)
APPEARANCE UR: CLEAR — SIGNIFICANT CHANGE UP
AST SERPL-CCNC: 27 U/L — SIGNIFICANT CHANGE UP (ref 15–37)
BASOPHILS # BLD AUTO: 0.02 K/UL — SIGNIFICANT CHANGE UP (ref 0–0.2)
BASOPHILS NFR BLD AUTO: 0.2 % — SIGNIFICANT CHANGE UP (ref 0–2)
BILIRUB SERPL-MCNC: 0.5 MG/DL — SIGNIFICANT CHANGE UP (ref 0.2–1.2)
BILIRUB UR-MCNC: NEGATIVE — SIGNIFICANT CHANGE UP
BUN SERPL-MCNC: 24 MG/DL — HIGH (ref 7–23)
CALCIUM SERPL-MCNC: 8.8 MG/DL — SIGNIFICANT CHANGE UP (ref 8.5–10.1)
CHLORIDE SERPL-SCNC: 106 MMOL/L — SIGNIFICANT CHANGE UP (ref 96–108)
CHOLEST SERPL-MCNC: 194 MG/DL — SIGNIFICANT CHANGE UP
CO2 SERPL-SCNC: 25 MMOL/L — SIGNIFICANT CHANGE UP (ref 22–31)
COLOR SPEC: YELLOW — SIGNIFICANT CHANGE UP
COVID-19 SPIKE DOMAIN AB INTERP: POSITIVE
COVID-19 SPIKE DOMAIN ANTIBODY RESULT: >250 U/ML — HIGH
CREAT SERPL-MCNC: 1.4 MG/DL — HIGH (ref 0.5–1.3)
CULTURE RESULTS: NO GROWTH — SIGNIFICANT CHANGE UP
DIFF PNL FLD: ABNORMAL
EOSINOPHIL # BLD AUTO: 0.01 K/UL — SIGNIFICANT CHANGE UP (ref 0–0.5)
EOSINOPHIL NFR BLD AUTO: 0.1 % — SIGNIFICANT CHANGE UP (ref 0–6)
ESTIMATED AVERAGE GLUCOSE: 174 MG/DL — HIGH (ref 68–114)
FOLATE SERPL-MCNC: >20 NG/ML — SIGNIFICANT CHANGE UP
GLUCOSE SERPL-MCNC: 247 MG/DL — HIGH (ref 70–99)
GLUCOSE UR QL: 100 MG/DL
HCT VFR BLD CALC: 37.2 % — SIGNIFICANT CHANGE UP (ref 34.5–45)
HCV AB S/CO SERPL IA: 0.09 S/CO — SIGNIFICANT CHANGE UP (ref 0–0.99)
HCV AB SERPL-IMP: SIGNIFICANT CHANGE UP
HDLC SERPL-MCNC: 49 MG/DL — LOW
HGB BLD-MCNC: 12.2 G/DL — SIGNIFICANT CHANGE UP (ref 11.5–15.5)
IMM GRANULOCYTES NFR BLD AUTO: 0.5 % — SIGNIFICANT CHANGE UP (ref 0–1.5)
KETONES UR-MCNC: NEGATIVE — SIGNIFICANT CHANGE UP
LACTATE SERPL-SCNC: 2.3 MMOL/L — HIGH (ref 0.7–2)
LEUKOCYTE ESTERASE UR-ACNC: NEGATIVE — SIGNIFICANT CHANGE UP
LIPID PNL WITH DIRECT LDL SERPL: 110 MG/DL — HIGH
LYMPHOCYTES # BLD AUTO: 0.75 K/UL — LOW (ref 1–3.3)
LYMPHOCYTES # BLD AUTO: 6.4 % — LOW (ref 13–44)
MCHC RBC-ENTMCNC: 28.8 PG — SIGNIFICANT CHANGE UP (ref 27–34)
MCHC RBC-ENTMCNC: 32.8 GM/DL — SIGNIFICANT CHANGE UP (ref 32–36)
MCV RBC AUTO: 87.9 FL — SIGNIFICANT CHANGE UP (ref 80–100)
MONOCYTES # BLD AUTO: 0.23 K/UL — SIGNIFICANT CHANGE UP (ref 0–0.9)
MONOCYTES NFR BLD AUTO: 2 % — SIGNIFICANT CHANGE UP (ref 2–14)
NEUTROPHILS # BLD AUTO: 10.7 K/UL — HIGH (ref 1.8–7.4)
NEUTROPHILS NFR BLD AUTO: 90.8 % — HIGH (ref 43–77)
NITRITE UR-MCNC: NEGATIVE — SIGNIFICANT CHANGE UP
NON HDL CHOLESTEROL: 145 MG/DL — HIGH
NRBC # BLD: 0 /100 WBCS — SIGNIFICANT CHANGE UP (ref 0–0)
PH UR: 5 — SIGNIFICANT CHANGE UP (ref 5–8)
PLATELET # BLD AUTO: 194 K/UL — SIGNIFICANT CHANGE UP (ref 150–400)
POTASSIUM SERPL-MCNC: 3.8 MMOL/L — SIGNIFICANT CHANGE UP (ref 3.5–5.3)
POTASSIUM SERPL-SCNC: 3.8 MMOL/L — SIGNIFICANT CHANGE UP (ref 3.5–5.3)
PROCALCITONIN SERPL-MCNC: <0.05 NG/ML — HIGH (ref 0–0.04)
PROT SERPL-MCNC: 7.5 G/DL — SIGNIFICANT CHANGE UP (ref 6–8.3)
PROT UR-MCNC: 100
RAPID RVP RESULT: SIGNIFICANT CHANGE UP
RBC # BLD: 4.23 M/UL — SIGNIFICANT CHANGE UP (ref 3.8–5.2)
RBC # FLD: 12.9 % — SIGNIFICANT CHANGE UP (ref 10.3–14.5)
SARS-COV-2 IGG+IGM SERPL QL IA: >250 U/ML — HIGH
SARS-COV-2 IGG+IGM SERPL QL IA: POSITIVE
SARS-COV-2 RNA SPEC QL NAA+PROBE: SIGNIFICANT CHANGE UP
SODIUM SERPL-SCNC: 140 MMOL/L — SIGNIFICANT CHANGE UP (ref 135–145)
SP GR SPEC: 1.02 — SIGNIFICANT CHANGE UP (ref 1.01–1.02)
SPECIMEN SOURCE: SIGNIFICANT CHANGE UP
TRIGL SERPL-MCNC: 172 MG/DL — HIGH
UROBILINOGEN FLD QL: NEGATIVE — SIGNIFICANT CHANGE UP
VIT B12 SERPL-MCNC: >2000 PG/ML — HIGH (ref 232–1245)
WBC # BLD: 11.77 K/UL — HIGH (ref 3.8–10.5)
WBC # FLD AUTO: 11.77 K/UL — HIGH (ref 3.8–10.5)

## 2021-04-29 PROCEDURE — 99223 1ST HOSP IP/OBS HIGH 75: CPT

## 2021-04-29 PROCEDURE — 70450 CT HEAD/BRAIN W/O DYE: CPT | Mod: 26

## 2021-04-29 PROCEDURE — 71250 CT THORAX DX C-: CPT | Mod: 26

## 2021-04-29 PROCEDURE — 99221 1ST HOSP IP/OBS SF/LOW 40: CPT | Mod: GC

## 2021-04-29 PROCEDURE — 70553 MRI BRAIN STEM W/O & W/DYE: CPT | Mod: 26

## 2021-04-29 PROCEDURE — 99497 ADVNCD CARE PLAN 30 MIN: CPT

## 2021-04-29 PROCEDURE — 93306 TTE W/DOPPLER COMPLETE: CPT | Mod: 26

## 2021-04-29 PROCEDURE — 93010 ELECTROCARDIOGRAM REPORT: CPT

## 2021-04-29 RX ORDER — METOPROLOL TARTRATE 50 MG
1.25 TABLET ORAL EVERY 6 HOURS
Refills: 0 | Status: DISCONTINUED | OUTPATIENT
Start: 2021-04-29 | End: 2021-04-30

## 2021-04-29 RX ORDER — HYDRALAZINE HCL 50 MG
10 TABLET ORAL ONCE
Refills: 0 | Status: DISCONTINUED | OUTPATIENT
Start: 2021-04-29 | End: 2021-04-30

## 2021-04-29 RX ORDER — ATORVASTATIN CALCIUM 80 MG/1
80 TABLET, FILM COATED ORAL AT BEDTIME
Refills: 0 | Status: DISCONTINUED | OUTPATIENT
Start: 2021-04-29 | End: 2021-04-30

## 2021-04-29 RX ORDER — HEPARIN SODIUM 5000 [USP'U]/ML
5000 INJECTION INTRAVENOUS; SUBCUTANEOUS EVERY 8 HOURS
Refills: 0 | Status: DISCONTINUED | OUTPATIENT
Start: 2021-04-29 | End: 2021-04-30

## 2021-04-29 RX ORDER — ASPIRIN/CALCIUM CARB/MAGNESIUM 324 MG
300 TABLET ORAL DAILY
Refills: 0 | Status: DISCONTINUED | OUTPATIENT
Start: 2021-04-29 | End: 2021-04-30

## 2021-04-29 RX ADMIN — HEPARIN SODIUM 5000 UNIT(S): 5000 INJECTION INTRAVENOUS; SUBCUTANEOUS at 21:10

## 2021-04-29 RX ADMIN — Medication 4: at 12:31

## 2021-04-29 RX ADMIN — Medication 650 MILLIGRAM(S): at 05:49

## 2021-04-29 RX ADMIN — HEPARIN SODIUM 5000 UNIT(S): 5000 INJECTION INTRAVENOUS; SUBCUTANEOUS at 13:20

## 2021-04-29 RX ADMIN — Medication 1.25 MILLIGRAM(S): at 17:10

## 2021-04-29 RX ADMIN — Medication 4: at 05:31

## 2021-04-29 RX ADMIN — Medication 1 MILLIGRAM(S): at 11:53

## 2021-04-29 RX ADMIN — Medication 1.25 MILLIGRAM(S): at 12:22

## 2021-04-29 RX ADMIN — HEPARIN SODIUM 5000 UNIT(S): 5000 INJECTION INTRAVENOUS; SUBCUTANEOUS at 05:29

## 2021-04-29 RX ADMIN — Medication 1.25 MILLIGRAM(S): at 23:19

## 2021-04-29 RX ADMIN — ATORVASTATIN CALCIUM 80 MILLIGRAM(S): 80 TABLET, FILM COATED ORAL at 21:12

## 2021-04-29 RX ADMIN — Medication 300 MILLIGRAM(S): at 12:22

## 2021-04-29 RX ADMIN — INSULIN GLARGINE 35 UNIT(S): 100 INJECTION, SOLUTION SUBCUTANEOUS at 21:11

## 2021-04-29 NOTE — CONSULT NOTE ADULT - ATTENDING COMMENTS
I was physically present for the key portions of the evaluation and management (E/M) service provided.  I agree with the above history, physical, and plan, which I have reviewed and edited where appropriate.     Pt is altered. Unable to provide meaningful information. No signs of significant ischemia or volume overload. EKG without ischemic changes. HTN urgency. given possible cva allow for permissive htn. will cont with IV hydralazine and lopressor IV with PRN dosing. monitor bp closely.   cont asa  neuro fu  check echo  monitor tele for occult af  Monitor and replete electrolytes. Keep K>4.0 and Mg>2.0.  Further cardiac workup will depend on clinical course.

## 2021-04-29 NOTE — DIETITIAN INITIAL EVALUATION ADULT. - NSPROEDAREADYLEARN_GEN_A_NUR
Pt noted with elevated HgbA1c and lipid panel however education not appropriate at this time, will remain available and f/u with education if appropriate./acuteness of illness

## 2021-04-29 NOTE — SPEECH LANGUAGE PATHOLOGY EVALUATION - COMMENTS
Consult received and chart reviewed. Attempted speech and language assessment this PM. Upon arrival, nursing assistant reported pt is s/p ativan for MRI administered earlier today and has been significantly lethargic/minimally arousable. RN confirmed report and is in agreement to hold speech and language assessment at this time. Will f/u when pt is able to actively participate in assessment. Called out to MD.

## 2021-04-29 NOTE — CONSULT NOTE ADULT - SUBJECTIVE AND OBJECTIVE BOX
[facility] DIVISION of INFECTIOUS DISEASE  Roscoe Zaman MD PhD, Glory Chapa MD, Dori Howard MD, Bernton Wolfe MD  and providing coverage with Ana Manzano MD and Stew Joshi MD  Providing Infectious Disease Consultations at [facility], [facility], [facility]'s    Office# [phone] to schedule follow up appointments  Answering Service for urgent calls or New Consults [phone]  Cell# to text for urgent issues Roscoe Austyn [phone]     HPI:  75y Female brought to ED AMS abrupt onset since 1:30 PM   witnessed by .  Patient unable to provide any history due to confusion.   states she was having difficulty in walkingthe day PTA andthen had decreased oral intake.  Patient and  were outside working in the yard when patient became more confused.   thought it was due to blood sugar and gave her something to drink.  Patient with clear speech but difficult with word finding initially then AMS progressed to be more profound.  Not on blood thinners.  No headache no nausea or vomiting.      PAST MEDICAL & SURGICAL HISTORY:  HTN (hypertension)  Hyperlipidemia  DM (diabetes mellitus)  Osteoarthritis  Anal cancer  Dysphagia  Tubal ligation status  Ectopic pregnancy    S/P hip replacement  right hip replacement  S/P tonsillectomy    Antimicrobials      Immunological      Other  acetaminophen  Suppository .. 650 milliGRAM(s) Rectal every 6 hours PRN  aspirin Suppository 300 milliGRAM(s) Rectal daily  atorvastatin 80 milliGRAM(s) Oral at bedtime  dextrose 40% Gel 15 Gram(s) Oral once  dextrose 50% Injectable 25 Gram(s) IV Push once  dextrose 50% Injectable 12.5 Gram(s) IV Push once  dextrose 50% Injectable 25 Gram(s) IV Push once  glucagon  Injectable 1 milliGRAM(s) IntraMuscular once  heparin   Injectable 5000 Unit(s) SubCutaneous every 8 hours  hydrALAZINE Injectable 10 milliGRAM(s) IV Push every 8 hours PRN  hydrALAZINE Injectable 10 milliGRAM(s) IV Push once  insulin glargine Injectable (LANTUS) 35 Unit(s) SubCutaneous at bedtime  insulin lispro (ADMELOG) corrective regimen sliding scale   SubCutaneous three times a day before meals  insulin lispro (ADMELOG) corrective regimen sliding scale   SubCutaneous at bedtime  metoprolol tartrate Injectable 1.25 milliGRAM(s) IV Push every 6 hours      Allergies    morphine (Pruritus)    Intolerances        SOCIAL HISTORY:  no toxic habits reported      FAMILY HISTORY:  Family history of prostate cancer (Father, Mother)  Family history of diabetes mellitus (DM) (Mother)  Family history of hypertension (Mother)        ROS:  limited due to cognitive status    Vital Signs Last 24 Hrs  T(C): 37.7 (2021 09:59), Max: 39.2 (2021 22:59)  T(F): 99.8 (2021 09:59), Max: 102.5 (2021 22:59)  HR: 78 (2021 09:59) (71 - 84)  BP: 147/76 (2021 09:59) (147/67 - 232/100)  BP(mean): --  RR: 18 (2021 09:59) (16 - 22)  SpO2: 97% (2021 09:59) (96% - 98%)    PE:  WDWN in no distress  HEENT:  NC, PERRL, sclerae anicteric, conjunctivae clear, EOMI.  Sinuses nontender, no nasal exudate.  No buccal or pharyngeal lesions, erythema or exudate  Neck:  Supple, no adenopathy  Lungs:  No accessory muscle use, bilaterally clear to auscultation  Cor: distant  Abd:  Symmetric, normoactive BS.  Soft, nontender, no masses, guarding or rebound.  Liver and spleen not enlarged  Extrem:  No cyanosis or edema  Skin:  No rashes.  Neuro: nonverbal, not following commands        LABS:                        12.2   11.77 )-----------( 194      ( 2021 00:00 )             37.2       WBC Count: 11.77 K/uL (21 @ 00:00)  WBC Count: 6.87 K/uL (21 @ 17:40)          140  |  106  |  24<H>  ----------------------------<  247<H>  3.8   |  25  |  1.40<H>    Ca    8.8      2021 00:00    TPro  7.5  /  Alb  4.0  /  TBili  0.5  /  DBili  x   /  AST  27  /  ALT  35  /  AlkPhos  82        Creatinine, Serum: 1.40 mg/dL (21 @ 00:00)  Creatinine, Serum: 1.40 mg/dL (21 @ 17:40)      Urinalysis Basic - ( 2021 07:24 )    Color: Yellow / Appearance: Clear / S.020 / pH: x  Gluc: x / Ketone: Negative  / Bili: Negative / Urobili: Negative   Blood: x / Protein: 100 / Nitrite: Negative   Leuk Esterase: Negative / RBC: 0-2 /HPF / WBC 3-5   Sq Epi: x / Non Sq Epi: Occasional / Bacteria: Moderate      MICROBIOLOGY:      RADIOLOGY & ADDITIONAL STUDIES:    --< from: MR Head w/wo IV Cont (21 @ 12:11) >    EXAM:  MR BRAIN WAW IC                            PROCEDURE DATE:  2021          INTERPRETATION:  MRI brain with and without contrast    History altered mental status and right-sided weakness    Contrast Gadavist 6.5 cc; 1 cc discarded    Comparison CT performed 21  Image quality is somewhat limited by motion artifact  There is no enhancing mass lesion. There is mild central volume loss and chronic microvascular ischemic change, typical for age without mass effect, cortical edema orhydrocephalus. There is no evidence of acute infarct or previous parenchymal hemorrhage. The orbital and sellar contents and cerebellar tonsils are within normal limits.    IMPRESSION:  Normal for age

## 2021-04-29 NOTE — DIETITIAN INITIAL EVALUATION ADULT. - OTHER INFO
As per chart pt is a 75 year old female with a PMH of Diabetes, Dysphagia, HTN, HLD, admitted with AMS, HTN urgency.     Pt seen at bedside, noted to be non-verbal unable to participate in interview at this time. Pt is currently NPO pending SLP eval. Noted with hx of DM, on Metformin, Lantus, Novolin PTA, current HgbA1c 7.7%, slightly elevated. Pt's admission weight 149.11lbs, current weight per chart (4/29) 149.6lbs. Pt appears appropriately nourished for age. No GI distress noted at this time, no BM since admission.     No pressure injuries noted at this time As per chart pt is a 75 year old female with a PMH of Diabetes, Dysphagia, HTN, HLD, admitted with AMS, HTN urgency.     Pt seen at bedside, noted to be non-verbal unable to participate in interview at this time. Additional information obtained from pt's  over the phone. Pt is currently NPO pending SLP eval. Pt listed with hx of dysphagia however  has no recall of this. Noted with hx of DM, on Metformin, Lantus, Novolin PTA, current HgbA1c 7.7%, slightly elevated. Pt's admission weight 149.11lbs, current weight per chart (4/29) 149.6lbs. Pt's  reports pt's current weight of 150lbs, states that her weight has increased over the past several months and her "stomach appears larger". No GI distress noted at this time, no BM since admission.     No pressure injuries noted at this time

## 2021-04-29 NOTE — GOALS OF CARE CONVERSATION - ADVANCED CARE PLANNING - CONVERSATION DETAILS
Writer met with patient spouse, Sid.  Patient returned from MRI, pt sleeping, was informed pt was medicated with Ativan prior to MRI.   Reviewed patient's medical and social history as well as events leading to patient's hospitalization, pt with some short term memory loss, with a little difficulty walking . Writer discussed patient's current diagnosis ( AMS, work up for CVA HTN urgency, DM poor control.), Spouse spoke of pt controlling her DM with po and insulin coverage, has seen endocrinologist.  Patients medical condition and management,  uncertain prognosis. Inquired about patient's wishes regarding extent of medical care to be provided including escalation of medical care into the ICU and use of vasopressor support. In addition, the writer inquired about thoughts regarding cardiopulmonary resuscitation, artificial nutrition and hydration including use of feeding tubes and IVF, antibiotics.  Spouse showed needs more information regarding results of MRI, and if symptoms will resolve.   All questions answered. Contact # for MD given.  Pt has a HCP, spouse, no directives in place at this time.  Psychosocial support provided.  PC RN contact # given , will follow.

## 2021-04-29 NOTE — ED ADULT NURSE REASSESSMENT NOTE - NS ED NURSE REASSESS COMMENT FT1
Received critical lactate 2.3, paged MD Dutton, awaiting response. Primary RN Kory cao. Received critical lactate 2.3, paged MD Dutton, awaiting response. Primary RN Kory cao. House doctor MD Espitia made aware of lactate.

## 2021-04-29 NOTE — CONSULT NOTE ADULT - SUBJECTIVE AND OBJECTIVE BOX
TIME:  9 a.m.    REASON FOR CONSULTATION:  Altered mental status.    Information obtained from the spouse.    HISTORY OF PRESENT ILLNESS:  The patient is a 75-year-old female with a past medical history, spouse has noted recently that she is becoming more forgetful, had an episode on Tuesday where she was sitting on the stairs, stated that she felt weak.  She could not walk.  They were able to get her up and ambulate her.  Then again once they had a similar event that she felt weak in her legs, but they were able to ambulate her.  They noticed that she started to become more disoriented, having problems expressing herself.  Other than that has been in a normal state of health, did not have any other complaints.  I was asked to evaluate secondary to an episode of altered mental status.    PAST MEDICAL HISTORY:  Hypertension, hyperlipidemia, diabetes, anal cancer for which she did receive radiation and chemo.    PAST SURGICAL HISTORY:  Hip.  No deficits.    MEDICATIONS:  On the outside are lisinopril 20 mg once a day, escitalopram 5 mg once a day, Lipitor 20 mg at bedtime, and Lantus.    SOCIAL HISTORY:  No current tobacco.  No current alcohol.    FAMILY HISTORY:  Mother and aunt both had dementia.    ALLERGIES:  MORPHINE.    REVIEW OF SYSTEMS:  Extremely limited secondary to the patient having severe expressive aphasia, but as per my conversation with the spouse had been in a normal state of health.  No history of headaches or fevers but was complaining of difficulty ambulating, weakness in her legs.    PHYSICAL EXAMINATION:  VITAL SIGNS:  Temperature 101.9, pulse 80, blood pressure 147/67, respirations 18.  HEENT:  Head:  Normocephalic, atraumatic.  Eyes:  No scleral icterus.  Ears:  Hearing appeared to be intact as per my conversation with the spouse.  NECK:  Supple.  RESPIRATORY:  Good air entry bilaterally.  CARDIOVASCULAR:  S1 and S2 heard.  ABDOMEN:  Soft and nontender.  EXTREMITIES:  No clubbing or cyanosis was noted.  NEUROLOGIC:  The patient is awake and alert.  Extraocular movements were intact.  Positive blink to bilateral visual threat, was unable to evaluate for visual fields secondary to the patient has expressive aphasia.  Smile was symmetric.  Speech:  The patient occasionally would say one-word to two words, would only repeat "o boy" would not put sentences together, would not name objects.  The patient had difficulty following simple and complex commands.  Motor:  Left upper appeared to be 5/5.  Right upper had decreased range of motion of the shoulders, was able to elevate roughly about 50 degrees, overall strength was 3/5.  Had decreased motion of her right hand grasp.  Left lower extremity was 5/5.  Right lower extremity was 3+/5.  Sensory:  Applied painful stimuli to all four extremities.  Positive facial grimace.  Unable to evaluate for double simultaneous stimulation.    LABORATORY AND DIAGNOSTIC DATA:  WBCs 11.77 with a hemoglobin and hematocrit of 12.2 and 37.2, platelets of 194.  Sodium 140, potassium 3.8, chloride 106, carbon dioxide is 25, BUN and creatinine are 24 and 1.40 with a glucose of 247.  Nitrites negative, leukocyte esterase negative.  CAT scan of the head shows no intracerebral hemorrhage or cerebrovascular accident, motion degraded.  CT angiogram of the head and neck shows no large vessel occlusion.  CT of the chest shows no consolidations.    ANALYSIS AND PLAN:  A 75-year-old with expressive aphasia and right hemiparesis.  Clinical impression for expressive aphasia, right hemiparesis, most likely cerebrovascular accident of the left MCA territory of unclear etiology.  What could be calculated from the NIH stroke scale would be roughly 8.  We will plan for an MRI and MRA imaging of the brain with and without contrast secondary to a history of cancer.  We will recommend aspirin.  At present, the patient is on aspirin 300 per rectum.  The patient is able to swallow, then we will convert the patient over to aspirin 325 mg once a day for two weeks, then can cut down to 81 mg.  We will plan for high-dose statin.  Would check TSH, lipid panel, hemoglobin A1c.  For history of diabetes, would recommend strict control of blood sugars.  For history of hypertension, for the next 24 to 48 hours, would recommend to allow permissive hypertensive.  Please do not treat systolic blood pressure unless it is above 200 or diastolic blood pressure above 105.  For history of hyperlipidemia, statin.  For underlying anxiety and depression, we will continue the patient on home psychiatric medications.  Will get Physical Therapy evaluation.  Fall precaution.    Advance care directives were discussed with the spouse.    For memory impairment, most likely underlying mild cognitive impairment, will check TSH, vitamin B12, and folate.    Spoke with the spouse, Sid, at [phone], alternate number is [phone].              ANALYSIS AND PLAN:  This is a 75-year-old with an episode of expressive aphasia and right hemiparesis.  For expressive aphagia, mutism, and hemiparesis, suspect most likely this is cerebrovascular accident of the left MCA territory of unclear etiology.  What could be calculated from the patient's NIH stroke scale would be roughly [_____]

## 2021-04-29 NOTE — CONSULT NOTE ADULT - SUBJECTIVE AND OBJECTIVE BOX
exp aphaisa right hemiparesis Clinical impression is most likely left hemispheric cerebrovascular accident.    echocardiogram to evaluate ejection fraction.  MRI brain with and without contrast   I would recommend to check TSH,lipid panel  and A1C  Avoid hypotension, allow permissive hypertension, keep SBP above 150 and below 200 if possible   In the first 48 to 72 hours, blood pressure should be allowed to autoregulate upwards (permissive hypertension). Consider holding patients’ antihypertensive medications in the hyperacute setting (caution when holding beta-blockers in patients with atrial fibrillation).  Goal 15% BP reduction during the after 24 hours for today do not treat unless SBP above 200 or DBP above 105  Head of bed is optimally elevated to 15 degrees.  Would recommend to avoid half normal and D5 fluids, would recommend if necessary use only normal saline.  Would recommend aspirin 325 once a day for two weeks and can cut down to 81 if able to swallow for now on suppository   I will recommend cholesterol medications.  I will get Physical Therapy and Occupational Therapy.  I would recommend fall precautions.  spoke to spouse

## 2021-04-29 NOTE — CONSULT NOTE ADULT - ASSESSMENT
75y Female brought to ED AMS abrupt onset since 1:30 PM  4/28 witnessed by .  Patient unable to provide any history due to confusion.   states she was having difficulty in walkingthe day PTA andthen had decreased oral intake.  Patient and  were outside working in the yard when patient became more confused.   thought it was due to blood sugar and gave her something to drink.  Patient with clear speech but difficult with word finding initially then AMS progressed to be more profound.  Not on blood thinners.  No headache no nausea or vomiting.    Recommendations  AMS is not clearly due to stroke despite clinical picture, abrupt onset, no other obvious localization, minimal leukocytosis, agree with observation off abx. and will follow results of micro workup. Supple neck so low suspicion for meningitis.     Thank you for consulting us and involving us in the management of this most interesting and challenging case.  We will follow along in the care of this patient. Please call us at [phone] or text me directly on my cell# at [phone] with any concerns.

## 2021-04-29 NOTE — OCCUPATIONAL THERAPY INITIAL EVALUATION ADULT - PRECAUTIONS/LIMITATIONS, REHAB EVAL
right neglect noted and expressive/receptive language deficits noted/aspiration precautions/fall precautions/seizure precautions NPO, right neglect noted and expressive/receptive language deficits noted/aspiration precautions/fall precautions/seizure precautions

## 2021-04-29 NOTE — OCCUPATIONAL THERAPY INITIAL EVALUATION ADULT - GROOMING, PREVIOUS LEVEL OF FUNCTION, OT EVAL
Office Note - Neurosurgery   Pilar Gramajo 67 y o  female MRN: [id number]      Assessment:    Patient is gradually worsening  20-year-old woman with possible normal pressure hydrocephalus  She has had objective and subjective decline in gait and cognition and overall function since her last visit  This patient is a candidate for a right frontal ventriculoperitoneal shunt insertion  The degree of improvement is difficult to estimate and may be related to the extent that other diagnoses are contributing to clinical presentation  There is no alternative treatment for NPH, however overall function may be improved with additional treatment for other diagnoses  The goals of surgery:    1  Gait and balance are most likely to improve  Cognitive changes and incontinence are less likely to improve  The degree of improvement is difficult to predict and may be partial or negligible  The risks of surgery:    1  Risk of general anesthetic, infection and bleeding  2  Risk of neurological injury with new pain, weakness, numbness, speech or vision difficulties  Stroke, hemorrhage or seizures can occur  3  Risk of shunt malfunction or over-drainage, producing subdural hematoma,  requiring additional surgery or reprogramming  4  Risk of injury to thoracic and intraperitoneal structures  Once all questions were answered to their satisfaction, they asked to proceed with surgery  Medical clearance will be required  Thank you for referring your patient to the [address] [place] [address] Normal Pressure Hydrocephalus Program       Expected postoperative course, including activity restrictions, expected pain and postoperative medication were reviewed  Patient provided verbal consent to surgical procedure and signed consent form: Yes (at previous visit)  They understand that the abdominal approach may be open by myself versus laparoscopic with Dr Rosy Bowles      History, physical examination and diagnostic tests were reviewed and questions answered  Diagnosis, care plan and treatment options were discussed  The patient and sisters and daughter understand instructions and will follow up as directed  Plan:    Follow-up:  Surgery    Problem List Items Addressed This Visit        Nervous and Auditory    Normal pressure hydrocephalus    Relevant Orders    Case request operating room: Insertion of right frontal ventriculoperitoneal shunt (Completed)    Ambulatory referral to Family Practice          Subjective/Objective     Chief Complaint    Progressive difficulties with gait and balance, cognition  HPI    30-year-old woman accompanied by her daughter and 2 sisters  I last saw her in January of 2019  At that time we discussed insertion of right frontal  shunt  While lumbar puncture did not produce significant objective improvement in her gait and cognition, her family feel that for 2 days after the procedure her gait was more steady and she seems somewhat brighter and more alert and able to participate in conversations  Since then, she has progressive decline in gait and balance and cognition  Urinary incontinence is unchanged  She now requires a wheelchair to ambulate outside the house  Her daughter provides nearly 24 hour over site for her at her home  The patient previously decided not to proceed with surgery, but now on further discussion with her family, and her recent decline, feels that it is reasonable to proceed  JOSE GARCIA personally reviewed and updated  Review of Systems   Constitutional: Negative  HENT: Negative  Eyes: Negative  Respiratory: Positive for chest tightness (with activity ) and shortness of breath (with activity )  Cardiovascular: Negative  Gastrointestinal: Negative  Endocrine: Negative  Genitourinary: Positive for urgency (urinary incontinence )  Negative for frequency          Leakage when coughing, new symptom    Musculoskeletal: Positive for gait problem (increase in balance off, some falls )  Skin: Negative  Allergic/Immunologic: Negative  Neurological: Positive for weakness  Hematological: Bruises/bleeds easily (patient on ASA 81 and fish oil)  Psychiatric/Behavioral: Positive for confusion and decreased concentration  The patient is nervous/anxious  Increase in memory loss        Family History    Family History   Problem Relation Age of Onset    Heart attack Mother 39    Heart attack Father 61    No Known Problems Other     Breast cancer Sister     Alcohol abuse Daughter         history of    Heart attack Brother        Social History    Social History     Socioeconomic History    Marital status:       Spouse name: Not on file    Number of children: Not on file    Years of education: Not on file    Highest education level: Not on file   Occupational History    Occupation: Retired   Social Needs    Financial resource strain: Not on file    Food insecurity:     Worry: Not on file     Inability: Not on file   [employer] needs:     Medical: Not on file     Non-medical: Not on file   Tobacco Use    Smoking status: Former Smoker     Last attempt to quit:      Years since quittin 6    Smokeless tobacco: Never Used    Tobacco comment: no secondhand smoke exposure   Substance and Sexual Activity    Alcohol use: Yes     Comment: social    Drug use: No    Sexual activity: Not on file   Lifestyle    Physical activity:     Days per week: Not on file     Minutes per session: Not on file    Stress: Not on file   Relationships    Social connections:     Talks on phone: Not on file     Gets together: Not on file     Attends Yazidi service: Not on file     Active member of club or organization: Not on file     Attends meetings of clubs or organizations: Not on file     Relationship status: Not on file    Intimate partner violence:     Fear of current or ex partner: Not on file     Emotionally abused: Not on file Physically abused: Not on file     Forced sexual activity: Not on file   Other Topics Concern    Not on file   Social History Narrative    Living alone       Past Medical History    Past Medical History:   Diagnosis Date    Arthritis     Confusion 10/2/2018    Depression     Displaced fracture of distal phalanx of right thumb     GERD (gastroesophageal reflux disease)     [facility] attack ([facility] [address] )     Hyperlipidemia     Hypertension     Moderate episode of recurrent major depressive disorder ([facility] [address] ) 4/12/2019    Shortness of breath     Stage 2 chronic kidney disease 7/17/2019       Surgical History    Past Surgical History:   Procedure Laterality Date    APPENDECTOMY      CATARACT EXTRACTION      COLONOSCOPY      FL LUMBAR PUNCTURE  1/10/2019    SEPTOPLASTY      WRIST FRACTURE SURGERY Right        Medications      Current Outpatient Medications:     aspirin 81 MG tablet, Take 1 tablet by mouth daily, Disp: , Rfl:     atenolol (TENORMIN) 50 mg tablet, TAKE ONE TABLET BY MOUTH DAILY, Disp: 30 tablet, Rfl: 5    atorvastatin (LIPITOR) 40 mg tablet, TAKE ONE TABLET BY MOUTH EVERY DAY    OFFICE VISIT NEEDED, Disp: 90 tablet, Rfl: 3    escitalopram (LEXAPRO) 10 mg tablet, TAKE ONE TABLET BY MOUTH ONCE DAILY, Disp: 30 tablet, Rfl: 1    losartan (COZAAR) 50 mg tablet, TAKE ONE TABLET BY MOUTH TWO TIMES A DAY FOR 30 DAYS, Disp: 60 tablet, Rfl: 3    multivitamin (THERAGRAN) TABS, Take 1 tablet by mouth, Disp: , Rfl:     omeprazole (PriLOSEC) 20 mg delayed release capsule, Take 20 mg by mouth daily , Disp: , Rfl:     sodium chloride 1 g tablet, Take 1 tablet (1 g total) by mouth 3 (three) times a day, Disp: 90 tablet, Rfl: 4    Vitamin D, Cholecalciferol, 1000 units CAPS, Take 1 tablet by mouth, Disp: , Rfl:     Allergies    No Known Allergies    The following portions of the patient's history were reviewed and updated as appropriate: allergies, current medications, past family history, past medical history, past social history, past surgical history and problem list     Investigations    I personally reviewed the CT, NPH PT and NPH Cognitive results with the patient:    CT scan of the head without contrast dated July 22nd, 2019  Comparison to previous imaging  Stable appearance of ventricular system and overall degree of cortical atrophy  No new intra-axial or extra-axial lesions  NPH scale dated 8/2/2019, 11/15  PT gait assessment dated 8/2/2019  TUG 1 minutes 12 sec , TUGc 1 minutes 11 sec, DGI 7/24   MoCA dated 8/2/2019, 19/30  Physical Exam    Vitals:  Blood pressure 158/74, pulse 76, temperature 99 6 °F (37 6 °C), resp  rate 18, height 5' 2" (1 575 m), weight 70 2 kg (154 lb 12 8 oz), not currently breastfeeding  ,Body mass index is 28 31 kg/m²  Physical Exam   Constitutional: She appears well-developed and well-nourished  No distress  HENT:   Head: Atraumatic  Eyes: EOM are normal    Cardiovascular: Normal rate, regular rhythm and normal heart sounds  Pulmonary/Chest: Effort normal and breath sounds normal  No respiratory distress  Abdominal: Soft  Bowel sounds are normal  There is no tenderness  Musculoskeletal: She exhibits no deformity  Neurological: She is alert  No cranial nerve deficit  Coordination abnormal    Full power in lower extremities  No pronator or parietal drift  No sensory extinction or neglect  Walks with a short shuffling gait often catching left foot  Requires assist of 1  On bloc turn in for steps  Difficulty standing from a seated position or sitting from a standing position without assistance  Skin: Skin is warm and dry  Psychiatric: She has a normal mood and affect   Her behavior is normal      Neurologic Exam     Cranial Nerves     CN III, IV, VI   Extraocular motions are normal  independent

## 2021-04-29 NOTE — DIETITIAN INITIAL EVALUATION ADULT. - DOB: +DATEOFBIRTH
Rafa Rivera   Allergy Injection Note:    Zainab Cooper presents for an immunotherapy injection. The site of the injection was cleansed with an alcohol swab. Serum was injected into the site after pulling back on the plunger to prevent intravascular injection. After the injection and was instructed to wait in the allergy waiting area for 30 minutes. There was no problems with the injection.    Allergy Shot Questionnaire  Injection nurse/assistant: Rafa Rivera ST/AT  Have you had increased asthma symptoms in past week?: no  Have you had increased allergy symptoms in the last week?: no  Have you had a cold, respiratory tract infection or flu like symptoms in the past 2 weeks?: no  Did you have any problems within 12 hours of the last injection?: no  Are you on any new medications/ eye drops?: no  Are you on any beta blockers?: no  If female, are you pregnant?: no  I have confirmed the name and birth date on my allergy vial. : yes  Epipen available?: yes  Epipen Lot Number: [id number]  Epipen Expiration Date: 12/2021  Number of allergy injections given: 2     Injection Details:  Vial 1   Vial 1 Expiration Date: 05/26/21  Vial 1 Series: 2  Shot type: escalation, pollens  Vial 1 Dose (mL): 0.05 Vial test  Vial 1 Location: Left upper arm  Vial 1 Vial Test Reaction (in mm): 11    Vial 2  Vial 2 Expiration Date: 05/26/21  Vial 2 Series: 2  Shot type: escalation, non pollens  Vial 2 Dose (mL): 0.05 Vial test  Vial 2 Location: Right upper arm  Vial 2 Vial Test Reaction (in mm): 11     Rafa Rivera  3/17/2021  16:28 CDT    
I have reviewed the notes, assessments, and/or procedures performed by Rafa Rivera, I concur with her/his documentation of Zainab Coopre.    
Statement Selected

## 2021-04-29 NOTE — OCCUPATIONAL THERAPY INITIAL EVALUATION ADULT - STRENGTHENING, PT EVAL
Improve right UE AROM/muscle strength to enable patient to use right UE as an assist during transfers in 3-5 sessions.

## 2021-04-29 NOTE — SPEECH LANGUAGE PATHOLOGY EVALUATION - SLP PERTINENT HISTORY OF CURRENT PROBLEM
Per charting, "76 y/o female with PMHx HTN, HLD, DMT2 presented to ED earlier today with AMS, aphasia witnessed by  at home, admitted for HTN urgency and AMS workup, now with new onset fever "

## 2021-04-29 NOTE — OCCUPATIONAL THERAPY INITIAL EVALUATION ADULT - PERTINENT HX OF CURRENT PROBLEM, REHAB EVAL
74 y/o female with PMH HTN, HLD, DMT2 presented to ED with AMS and aphasia. Pt admitted 4/28/2021 for HTN urgency and suspected CVA. CT brain (-) gross acute intracranial hemorrhage or mass effect.

## 2021-04-29 NOTE — OCCUPATIONAL THERAPY INITIAL EVALUATION ADULT - ADDITIONAL COMMENTS
Pt unable to provide hx or answer questions due to expressive/receptive language deficits. As per EMR, patient lived with her spouse. Pt exhibits decreased attention to right visual field, impaired motor planning, impaired cognitive processing, decreased AROM/strength Right LE/UE, decreased endurance and impaired sitting/standing balance. Pt performed sit to stand and ambulated 5' taking sidesteps alongside EOB using RW with min assist x 2.

## 2021-04-29 NOTE — CONSULT NOTE ADULT - NSICDXPROBLEMREC1_GEN_ALL_CORE_FT
cont lantus 35 units qhs  cont mod dose admelog scale coverage qac/qhs  to add standing pre-meal admelog pending further bg numbers  goal bg 100-180 in hosp setting  cont cons cho diet

## 2021-04-29 NOTE — OCCUPATIONAL THERAPY INITIAL EVALUATION ADULT - NS ASR FOLLOW COMMAND OT EVAL
expressive/receptive language deficits/50% of the time/able to follow single-step instructions/unable to answer questions

## 2021-04-29 NOTE — PHYSICAL THERAPY INITIAL EVALUATION ADULT - IMPAIRED TRANSFERS: SIT/STAND, REHAB EVAL
impaired balance/cognition/impaired coordination/decreased flexibility/impaired motor control/decreased strength

## 2021-04-29 NOTE — CONSULT NOTE ADULT - SUBJECTIVE AND OBJECTIVE BOX
Patient is a 75y old  Female who presents with a chief complaint of Altered mental status (29 Apr 2021 07:39)      Reason For Consult: dm2 uncontrolled    HPI:  Chart, labs and XR reports reviewed.  ADITYA CHAVEZ is a 75y Female brought to ED AMS since 1:30 PM  >3 hrs ago witnessed by .  Patient unable to provide any histrory due to confusion.   states she was having difficulty in walking yesterday and today and she had decreased oral intake.  Patient and  were outside working in the yard when patient became more confused.   thought it was due to blood sugar and gave her something to drink.  Patient with clear speech but difficult with word finding.  Not on blood thinners.  No headache no nausea or vomiting.       (28 Apr 2021 21:15)      PAST MEDICAL & SURGICAL HISTORY:  HTN (hypertension)    Hyperlipidemia    DM (diabetes mellitus)    Osteoarthritis    Anal cancer    Dysphagia    Tubal ligation status    Ectopic pregnancy    S/P hip replacement  right hip replacement    S/P tonsillectomy        FAMILY HISTORY:  Family history of prostate cancer (Father, Mother)    Family history of diabetes mellitus (DM) (Mother)    Family history of hypertension (Mother)          Social History:    MEDICATIONS  (STANDING):  aspirin Suppository 300 milliGRAM(s) Rectal daily  dextrose 40% Gel 15 Gram(s) Oral once  dextrose 50% Injectable 25 Gram(s) IV Push once  dextrose 50% Injectable 12.5 Gram(s) IV Push once  dextrose 50% Injectable 25 Gram(s) IV Push once  glucagon  Injectable 1 milliGRAM(s) IntraMuscular once  heparin   Injectable 5000 Unit(s) SubCutaneous every 8 hours  hydrALAZINE Injectable 10 milliGRAM(s) IV Push once  insulin glargine Injectable (LANTUS) 35 Unit(s) SubCutaneous at bedtime  insulin lispro (ADMELOG) corrective regimen sliding scale   SubCutaneous three times a day before meals  insulin lispro (ADMELOG) corrective regimen sliding scale   SubCutaneous at bedtime  metoprolol tartrate Injectable 1.25 milliGRAM(s) IV Push every 6 hours    MEDICATIONS  (PRN):  acetaminophen  Suppository .. 650 milliGRAM(s) Rectal every 6 hours PRN Temp greater or equal to 38C (100.4F)  hydrALAZINE Injectable 10 milliGRAM(s) IV Push every 8 hours PRN SBP> 160        T(C): 38.8 (04-29-21 @ 05:37), Max: 39.2 (04-28-21 @ 22:59)  HR: 81 (04-29-21 @ 04:40) (71 - 84)  BP: 160/66 (04-29-21 @ 04:40) (160/66 - 232/100)  RR: 18 (04-29-21 @ 04:40) (16 - 22)  SpO2: 96% (04-29-21 @ 04:40) (96% - 98%)  Wt(kg): --    PHYSICAL EXAM:  NECK: Supple, No JVD, Normal thyroid  CHEST/LUNG: Clear to percussion bilaterally; No rales, rhonchi, wheezing, or rubs  HEART: Regular rate and rhythm; No murmurs, rubs, or gallops  ABDOMEN: Soft, Nontender, Nondistended; Bowel sounds present  EXTREMITIES:  2+ Peripheral Pulses, No clubbing, cyanosis, or edema  SKIN: No rashes or lesions    CAPILLARY BLOOD GLUCOSE      POCT Blood Glucose.: 183 mg/dL (29 Apr 2021 07:54)  POCT Blood Glucose.: 230 mg/dL (29 Apr 2021 05:31)  POCT Blood Glucose.: 223 mg/dL (28 Apr 2021 22:53)  POCT Blood Glucose.: 199 mg/dL (28 Apr 2021 16:37)  POCT Blood Glucose.: 181 mg/dL (28 Apr 2021 16:14)                            12.2   11.77 )-----------( 194      ( 29 Apr 2021 00:00 )             37.2       CMP:  04-29 @ 00:00  SGPT 35  Albumin 4.0   Alk Phos 82   Anion Gap 9   SGOT 27   Total Bili 0.5   BUN 24   Calcium Total 8.8   CO2 25   Chloride 106   Creatinine 1.40   eGFR if AA 43   eGFR if non AA 37   Glucose 247   Potassium 3.8   Protein 7.5   Sodium 140      Thyroid Function Tests:  04-28 @ 21:24 TSH 1.91 FreeT4 -- T3 -- Anti TPO -- Anti Thyroglobulin Ab -- TSI --      Diabetes Tests:       Radiology:

## 2021-04-29 NOTE — OCCUPATIONAL THERAPY INITIAL EVALUATION ADULT - DIAGNOSIS, OT EVAL
Patient's Name:  Kevin Birch   :  2012     Kevin Birch was seen for follow up via telemedicine.  The patient location is: home  The chief complaint leading to consultation is: Evaluation of developmental-behavioral concerns   Visit type: Virtual visit with synchronous audio and video  Each patient to whom he or she provides medical services by telemedicine is:  (1) informed of the relationship between the physician and patient and the respective role of any other health care provider with respect to management of the patient; and (2) notified that he or she may decline to receive medical services by telemedicine and may withdraw from such care at any time.    INTERIM HISTORY:   Kevin was evaluated in May, 2019 and diagnosed with:   Autism spectrum disorder with accompanying language impairment, requiring very substantial support (level 3)     Kevin is not responding to melatonin at night. HE has been getting 10 mg and it is not working. Kevin is up during the night. Mom is concerned because he gest up and he gets into things during the night.   He goes to sleep for about 30 minutes, and then he awakens.  He has always had trouble with sleep, but things are worse lately.    He is active all the time.    He takes hydroxyzine for allergies at night. Mom says that the hydroxyzine doesn't make any difference for sleep.    Kevin has not received [facility] because of logistic problems. Mom cannot get Kevin to [facility].  He attends [employer], but is now doing school virtually.  He gets APE, speech therapy  and occupational therapy at school.    Mom says that Kevin is very active throughout the day.He is always moving and very noisy. He can sit and do his school work. But once it is over, he was out of his seat.   He does not run off in public.    MEDICATIONS and doses:   Current Outpatient Medications   Medication Sig Dispense Refill    albuterol (PROVENTIL) 2.5 mg /3 mL  (0.083 %) nebulizer solution       cefdinir (OMNICEF) 250 mg/5 mL suspension TAKE 7 MLS BY MOUTH ONCE DAILY FOR 7 DAYS. DISCARD REMAINDER 60 mL 0    cetirizine (ZYRTEC) 1 mg/mL syrup Take by mouth once daily.      diphenhydrAMINE (BENYLIN) 12.5 mg/5 mL liquid Take by mouth 4 (four) times daily as needed for Allergies.      hydrOXYzine (ATARAX) 10 mg/5 mL syrup Take 5 mLs (10 mg total) by mouth every evening. 200 mL 12    ketotifen (ZADITOR) 0.025 % (0.035 %) ophthalmic solution Place 1 drop into both eyes 2 (two) times daily. 10 mL 12    leucovorin (WELLCOVORIN) 15 MG tablet Take 1 tablet (15 mg total) by mouth 2 (two) times daily. 60 tablet 6    triamcinolone acetonide 0.1% (KENALOG) 0.1 % ointment        No current facility-administered medications for this visit.        ALLERGIES:  Hazelnut, Amoxil [amoxicillin], Nuts [tree nut], and Peanut     PHYSICAL EXAM:  Vital signs: There were no vitals taken for this visit.    GENERAL: well-developed and well-nourished  DYSMORPHIC FEATURES    None  Very active and noisy during the visit.  climbing all over, which mom describes as typical behavior    ASSESSMENT:  8 yo boy with:  · Autism Spectrum Disorder   · Sleep problems: falling and staying asleep  · Hyperactive impulsive behaviors     RECOMMENDATIONS:    Recommend adding clonidine for sleep.  And guanfacine during the day for hyperactive impulsive behaviors     Will need physical exam and blood pressure prior to starting medication and follow up      Please do not hesitate to contact me for further assistance.    Sincerely,      Laly Alvarado M.D., F.A.A.P.  Board Certified: Developmental-Behavioral Pediatrics    Copy to:  Family of   Kevin BAÑUELOS West [address] [place] [address]        Face to Face time with patient: 19 minutes of total time spent on the encounter, which includes face to face time and non-face to face time preparing to see the patient (e.g., review of tests), Obtaining and/or  reviewing separately obtained history, Documenting clinical information in the electronic or other health record, Independently interpreting results (not separately reported) and communicating results to the patient/family/caregiver, or Care coordination (not separately reported).        Patient with decreased ADL status and impairments with functional mobility.

## 2021-04-29 NOTE — OCCUPATIONAL THERAPY INITIAL EVALUATION ADULT - GENERAL OBSERVATIONS, REHAB EVAL
Patient found supine in bed with +IV lock, +telemonitor and +Tony catheter. Patient was mostly non-verbal though able to verbalize a couple of words. Patient appears to have impaired cognitive processing/motor planning, expressive/receptive language deficits and inattention to right visual field.

## 2021-04-29 NOTE — DIETITIAN INITIAL EVALUATION ADULT. - ADD RECOMMEND
1) If PO diet is medically feasible recommend Consistent CHO, DASH/TLC diet with texture and consistency deferred to SLP, 2) Monitor pt's PO intake, weight, skin, edema, GI distress

## 2021-04-29 NOTE — CONSULT NOTE ADULT - ASSESSMENT
74 y/o female with PMHx HTN, HLD, DMT2 presented to ED earlier today with AMS, aphasia witnessed by  at home, admitted for HTN urgency and AMS workup.        HTN urgency, awake but no verbal         76 y/o female with PMHx HTN, HLD, DMT2 presented to ED earlier today with AMS, aphasia witnessed by  at home, admitted for HTN urgency and AMS workup.        HTN urgency, awake but no verbal  - On telemetry, no events overnight  - BP since admission has been elevated: 173/17<-175<-160/66  - Takes at home lisinopril 20mg qd, holding in the setting of NORA (BUN/creat: 24/1.40 this am)  - On hydralazine 10 mg IVP q8h PRN for SBP > 160 and Metoprolol 1.25 mg IV q6h    - Given suspected CVA allowing permissive HTN, hold BP meds for SBP <180    - Monitor and replete lytes, keep K>4, Mg>2.    HLD   - chronic   - Triglycerides 172, HLD 49,   - Takes at home Lipitor 20 mg       - Other cardiovascular workup will depend on clinical course.  - All other workup per primary team.  - Will continue to follow.                     76 y/o female with PMHx HTN, HLD, DMT2 presented to ED earlier today with AMS, aphasia witnessed by  at home, admitted for HTN urgency and and suspected CVA      HTN urgency  - On telemetry, no events overnight  - BP since admission has been elevated: 173/17<-175<-160/66  - Takes at home lisinopril 20mg qd, holding in the setting of NORA (BUN/creat: 24/1.40 this am)  - On hydralazine 10 mg IVP q8h PRN for SBP > 160 and Metoprolol 1.25 mg IV q6h    - Would recommended Metoprolol 2.5mg IV q6h, allowing permissive HTN -170  - Follow up TTE   - If CVA is rule out can treat HTN more aggressively   - Monitor and replete lytes, keep K>4, Mg>2.    HLD   - chronic   - Triglycerides 172, HLD 49,   - Takes at home Lipitor 20 mg       - Other cardiovascular workup will depend on clinical course.  - All other workup per primary team.  - Will continue to follow.                     76 y/o female with PMHx HTN, HLD, DMT2 presented to ED earlier today with AMS, aphasia witnessed by  at home, admitted for HTN urgency and and suspected CVA      HTN urgency  - On telemetry, no events overnight  - BP since admission has been elevated: 173<-175<-160/66  - Takes at home lisinopril 20mg qd, holding in the setting of NORA (BUN/creat: 24/1.40 this am)  - If NPO consider IVF  - On hydralazine 10 mg IVP q8h PRN for SBP > 160 and Metoprolol 1.25 mg IV q6h    - Would recommended Metoprolol 2.5mg IV q6h, allowing permissive HTN -170  - Follow up TTE   - If CVA is rule out can treat HTN more aggressively   - Monitor and replete lytes, keep K>4, Mg>2.  - cont asa NH    HLD   - chronic   - Triglycerides 172, HLD 49,   - Takes at home Lipitor 20 mg HS  - resume when able to take po    - May need to r/o occult infection   - Other cardiovascular workup will depend on clinical course.  - All other workup per primary team.  - Will continue to follow.

## 2021-04-29 NOTE — OCCUPATIONAL THERAPY INITIAL EVALUATION ADULT - RANGE OF MOTION EXAMINATION, UPPER EXTREMITY
AROM R shd flexion ~0-80 degrees with decreased strength noted t/o right UE. Fine motor skills: WFL's left UE, severely impaired right UE./Left UE Active ROM was WFL (within functional limits)/Right UE Active Assistive ROM was WFL  (within functional limits)

## 2021-04-29 NOTE — SWALLOW BEDSIDE ASSESSMENT ADULT - COMMENTS
Consult received and chart reviewed. Attempted clinical swallow assessment this PM. Upon arrival, nursing assistant reported pt is s/p ativan for MRI administered earlier today and has been significantly lethargic/minimally arousable. RN confirmed report and is in agreement to hold clinical swallow assessment at this time. Will f/u when pt is able to actively participate in assessment. Called out to MD.

## 2021-04-29 NOTE — CONSULT NOTE ADULT - SUBJECTIVE AND OBJECTIVE BOX
[facility]         Tre Severino, Moris Cowan, Yamileth, Jeri Wolfe        [phone] (office)    Reason for Consult:    Interval Cardio: 74 y/o female with PMHx HTN, HLD, DMT2 presented to ED earlier today with AMS, aphasia witnessed by  at home, admitted for HTN urgency and AMS workup          PAST MEDICAL & SURGICAL HISTORY:  HTN (hypertension)    Hyperlipidemia    DM (diabetes mellitus)    Osteoarthritis    Anal cancer    Dysphagia    Tubal ligation status    Ectopic pregnancy    S/P hip replacement  right hip replacement    S/P tonsillectomy        SOCIAL HISTORY: No active tobacco, alcohol or illicit drug use    FAMILY HISTORY:  Family history of prostate cancer (Father, Mother)    Family history of diabetes mellitus (DM) (Mother)    Family history of hypertension (Mother)        Home Medications:  alendronate 70 mg oral tablet: 1 tab(s) orally once a week (28 Apr 2021 20:07)  Lantus Solostar Pen 100 units/mL subcutaneous solution: 34 unit(s) subcutaneous once a day    *as per spouse, pt may not be compliant on this regimen. (28 Apr 2021 20:07)  Lexapro 10 mg oral tablet: 1 tab(s) orally once a day (28 Apr 2021 20:07)  lisinopril 10 mg oral tablet: 1 tab(s) orally once a day (28 Apr 2021 20:07)  metFORMIN 750 mg oral tablet, extended release: 2 tab(s) orally once a day (28 Apr 2021 20:07)  NovoLIN 70/30 FlexPen subcutaneous suspension: 20 units subcutaneously at breakfast  15 units subcutaneously at dinner (28 Apr 2021 20:07)  pravastatin 40 mg oral tablet: 1 tab(s) orally once a day (28 Apr 2021 20:07)      MEDICATIONS  (STANDING):  aspirin Suppository 300 milliGRAM(s) Rectal daily  dextrose 40% Gel 15 Gram(s) Oral once  dextrose 50% Injectable 25 Gram(s) IV Push once  dextrose 50% Injectable 12.5 Gram(s) IV Push once  dextrose 50% Injectable 25 Gram(s) IV Push once  glucagon  Injectable 1 milliGRAM(s) IntraMuscular once  heparin   Injectable 5000 Unit(s) SubCutaneous every 8 hours  hydrALAZINE Injectable 10 milliGRAM(s) IV Push once  insulin glargine Injectable (LANTUS) 35 Unit(s) SubCutaneous at bedtime  insulin lispro (ADMELOG) corrective regimen sliding scale   SubCutaneous three times a day before meals  insulin lispro (ADMELOG) corrective regimen sliding scale   SubCutaneous at bedtime  metoprolol tartrate Injectable 1.25 milliGRAM(s) IV Push every 6 hours    MEDICATIONS  (PRN):  acetaminophen  Suppository .. 650 milliGRAM(s) Rectal every 6 hours PRN Temp greater or equal to 38C (100.4F)  hydrALAZINE Injectable 10 milliGRAM(s) IV Push every 8 hours PRN SBP> 160      Allergies    morphine (Pruritus)    Intolerances        REVIEW OF SYSTEMS: Negative except as per HPI.    VITAL SIGNS:   Vital Signs Last 24 Hrs  T(C): 38.8 (29 Apr 2021 05:37), Max: 39.2 (28 Apr 2021 22:59)  T(F): 101.9 (29 Apr 2021 05:37), Max: 102.5 (28 Apr 2021 22:59)  HR: 81 (29 Apr 2021 04:40) (71 - 84)  BP: 160/66 (29 Apr 2021 04:40) (160/66 - 232/100)  BP(mean): --  RR: 18 (29 Apr 2021 04:40) (16 - 22)  SpO2: 96% (29 Apr 2021 04:40) (96% - 98%)    I&O's Summary    28 Apr 2021 07:01  -  29 Apr 2021 07:00  --------------------------------------------------------  IN: 0 mL / OUT: 500 mL / NET: -500 mL        PHYSICAL EXAM:  Constitutional: NAD, well-developed  HEENT NC/AT, moist mucous membranes  Pulmonary: Non-labored, breath sounds are clear bilaterally, no wheezing, rales or rhonchi  Cardiovascular: +S1, S2, RRR, no murmur  Gastrointestinal: Soft, nontender, nondistended, normoactive bowel sounds  Extremities: No peripheral edema   Neurological: Alert, strength and sensitivity are grossly intact  Skin: No obvious lesions/rashes  Psych: Mood & affect appropriate    LABS: All Labs Reviewed:                        12.2 11.77 )-----------( 194      ( 29 Apr 2021 00:00 )             37.2                         11.5   6.87  )-----------( 197      ( 28 Apr 2021 17:40 )             35.1     29 Apr 2021 00:00    140    |  106    |  24     ----------------------------<  247    3.8     |  25     |  1.40   28 Apr 2021 17:40    138    |  104    |  29     ----------------------------<  168    4.1     |  32     |  1.40     Ca    8.8        29 Apr 2021 00:00  Ca    8.0        28 Apr 2021 17:40    TPro  7.5    /  Alb  4.0    /  TBili  0.5    /  DBili  x      /  AST  27     /  ALT  35     /  AlkPhos  82     29 Apr 2021 00:00  TPro  6.7    /  Alb  3.6    /  TBili  0.2    /  DBili  x      /  AST  27     /  ALT  38     /  AlkPhos  113    28 Apr 2021 17:40    PT/INR - ( 28 Apr 2021 17:40 )   PT: 11.8 sec;   INR: 1.01 ratio         PTT - ( 28 Apr 2021 17:40 )  PTT:29.4 sec      Blood Culture:     04-28 @ 21:24  TSH: 1.91      EKG:    RADIOLOGY:    CXR:   [facility]         Tre Severino, Camryn, Moris, Yamileth, Jeri Wolfe        [phone] (office)    Reason for Consult:    Interval Cardio: 74 y/o female with PMHx HTN, HLD, DMT2 presented to ED earlier today with AMS, aphasia witnessed by  at home, admitted for HTN urgency and AMS workup. Patient seen and examined at bedside, awake, no verbal, follow simple commands. Patient has been with febrile with T-max of 102.1 overnight, fever work up done. Suspected CVA allowing permissive HTN.   Unable to obtain meaningful history given patient medical condition.     EKG NSR @ 84 bpm, ST and T waves abnormality on lateral leads   Telemetry no events overnight           PAST MEDICAL & SURGICAL HISTORY:  HTN (hypertension)    Hyperlipidemia    DM (diabetes mellitus)    Osteoarthritis    Anal cancer    Dysphagia    Tubal ligation status    Ectopic pregnancy    S/P hip replacement  right hip replacement    S/P tonsillectomy        SOCIAL HISTORY: No active tobacco, alcohol or illicit drug use    FAMILY HISTORY:  Family history of prostate cancer (Father, Mother)    Family history of diabetes mellitus (DM) (Mother)    Family history of hypertension (Mother)        Home Medications:  alendronate 70 mg oral tablet: 1 tab(s) orally once a week (28 Apr 2021 20:07)  Lantus Solostar Pen 100 units/mL subcutaneous solution: 34 unit(s) subcutaneous once a day    *as per spouse, pt may not be compliant on this regimen. (28 Apr 2021 20:07)  Lexapro 10 mg oral tablet: 1 tab(s) orally once a day (28 Apr 2021 20:07)  lisinopril 10 mg oral tablet: 1 tab(s) orally once a day (28 Apr 2021 20:07)  metFORMIN 750 mg oral tablet, extended release: 2 tab(s) orally once a day (28 Apr 2021 20:07)  NovoLIN 70/30 FlexPen subcutaneous suspension: 20 units subcutaneously at breakfast  15 units subcutaneously at dinner (28 Apr 2021 20:07)  pravastatin 40 mg oral tablet: 1 tab(s) orally once a day (28 Apr 2021 20:07)      MEDICATIONS  (STANDING):  aspirin Suppository 300 milliGRAM(s) Rectal daily  dextrose 40% Gel 15 Gram(s) Oral once  dextrose 50% Injectable 25 Gram(s) IV Push once  dextrose 50% Injectable 12.5 Gram(s) IV Push once  dextrose 50% Injectable 25 Gram(s) IV Push once  glucagon  Injectable 1 milliGRAM(s) IntraMuscular once  heparin   Injectable 5000 Unit(s) SubCutaneous every 8 hours  hydrALAZINE Injectable 10 milliGRAM(s) IV Push once  insulin glargine Injectable (LANTUS) 35 Unit(s) SubCutaneous at bedtime  insulin lispro (ADMELOG) corrective regimen sliding scale   SubCutaneous three times a day before meals  insulin lispro (ADMELOG) corrective regimen sliding scale   SubCutaneous at bedtime  metoprolol tartrate Injectable 1.25 milliGRAM(s) IV Push every 6 hours    MEDICATIONS  (PRN):  acetaminophen  Suppository .. 650 milliGRAM(s) Rectal every 6 hours PRN Temp greater or equal to 38C (100.4F)  hydrALAZINE Injectable 10 milliGRAM(s) IV Push every 8 hours PRN SBP> 160      Allergies    morphine (Pruritus)    Intolerances        REVIEW OF SYSTEMS: Negative except as per HPI.    VITAL SIGNS:   Vital Signs Last 24 Hrs  T(C): 38.8 (29 Apr 2021 05:37), Max: 39.2 (28 Apr 2021 22:59)  T(F): 101.9 (29 Apr 2021 05:37), Max: 102.5 (28 Apr 2021 22:59)  HR: 81 (29 Apr 2021 04:40) (71 - 84)  BP: 160/66 (29 Apr 2021 04:40) (160/66 - 232/100)  BP(mean): --  RR: 18 (29 Apr 2021 04:40) (16 - 22)  SpO2: 96% (29 Apr 2021 04:40) (96% - 98%)    I&O's Summary    28 Apr 2021 07:01  -  29 Apr 2021 07:00  --------------------------------------------------------  IN: 0 mL / OUT: 500 mL / NET: -500 mL        PHYSICAL EXAM:  Constitutional: NAD  HEENT moist mucous membranes  Pulmonary: Non-labored, breath sounds are clear bilaterally, no wheezing, rales or rhonchi  Cardiovascular: +S1, S2, RRR, no murmur  Gastrointestinal: Soft, nontender, nondistended, normoactive bowel sounds  Extremities: No peripheral edema   Neurological: Alert, no verbal, follow simple commands       LABS: All Labs Reviewed:                        12.2   11.77 )-----------( 194      ( 29 Apr 2021 00:00 )             37.2                         11.5   6.87  )-----------( 197      ( 28 Apr 2021 17:40 )             35.1     29 Apr 2021 00:00    140    |  106    |  24     ----------------------------<  247    3.8     |  25     |  1.40   28 Apr 2021 17:40    138    |  104    |  29     ----------------------------<  168    4.1     |  32     |  1.40     Ca    8.8        29 Apr 2021 00:00  Ca    8.0        28 Apr 2021 17:40    TPro  7.5    /  Alb  4.0    /  TBili  0.5    /  DBili  x      /  AST  27     /  ALT  35     /  AlkPhos  82     29 Apr 2021 00:00  TPro  6.7    /  Alb  3.6    /  TBili  0.2    /  DBili  x      /  AST  27     /  ALT  38     /  AlkPhos  113    28 Apr 2021 17:40    PT/INR - ( 28 Apr 2021 17:40 )   PT: 11.8 sec;   INR: 1.01 ratio         PTT - ( 28 Apr 2021 17:40 )  PTT:29.4 sec      Blood Culture:     04-28 @ 21:24  TSH: 1.91      EKG:    RADIOLOGY:    CXR:   [facility]         Tre Severino, Camryn, Moris, Yamileth, Jeri Wolfe        [phone] (office)    Reason for Consult:    Interval Cardio: 76 y/o female with PMHx HTN, HLD, DMT2 presented to ED earlier today with AMS, aphasia witnessed by  at home, admitted for HTN urgency and AMS workup. Patient seen and examined at bedside, awake, no verbal, follow simple commands. Patient has been with febrile with T-max of 102.1 overnight, fever work up done. Suspected CVA allowing permissive HTN.   Unable to obtain meaningful history given patient medical condition.     EKG NSR @ 84 bpm, ST and T waves abnormality on lateral leads   Telemetry no events overnight       PAST MEDICAL & SURGICAL HISTORY:  HTN (hypertension)    Hyperlipidemia    DM (diabetes mellitus)    Osteoarthritis    Anal cancer    Dysphagia    Tubal ligation status    Ectopic pregnancy    S/P hip replacement  right hip replacement    S/P tonsillectomy        SOCIAL HISTORY: No active tobacco, alcohol or illicit drug use    FAMILY HISTORY:  Family history of prostate cancer (Father, Mother)    Family history of diabetes mellitus (DM) (Mother)    Family history of hypertension (Mother)        Home Medications:  alendronate 70 mg oral tablet: 1 tab(s) orally once a week (28 Apr 2021 20:07)  Lantus Solostar Pen 100 units/mL subcutaneous solution: 34 unit(s) subcutaneous once a day    *as per spouse, pt may not be compliant on this regimen. (28 Apr 2021 20:07)  Lexapro 10 mg oral tablet: 1 tab(s) orally once a day (28 Apr 2021 20:07)  lisinopril 10 mg oral tablet: 1 tab(s) orally once a day (28 Apr 2021 20:07)  metFORMIN 750 mg oral tablet, extended release: 2 tab(s) orally once a day (28 Apr 2021 20:07)  NovoLIN 70/30 FlexPen subcutaneous suspension: 20 units subcutaneously at breakfast  15 units subcutaneously at dinner (28 Apr 2021 20:07)  pravastatin 40 mg oral tablet: 1 tab(s) orally once a day (28 Apr 2021 20:07)      MEDICATIONS  (STANDING):  aspirin Suppository 300 milliGRAM(s) Rectal daily  dextrose 40% Gel 15 Gram(s) Oral once  dextrose 50% Injectable 25 Gram(s) IV Push once  dextrose 50% Injectable 12.5 Gram(s) IV Push once  dextrose 50% Injectable 25 Gram(s) IV Push once  glucagon  Injectable 1 milliGRAM(s) IntraMuscular once  heparin   Injectable 5000 Unit(s) SubCutaneous every 8 hours  hydrALAZINE Injectable 10 milliGRAM(s) IV Push once  insulin glargine Injectable (LANTUS) 35 Unit(s) SubCutaneous at bedtime  insulin lispro (ADMELOG) corrective regimen sliding scale   SubCutaneous three times a day before meals  insulin lispro (ADMELOG) corrective regimen sliding scale   SubCutaneous at bedtime  metoprolol tartrate Injectable 1.25 milliGRAM(s) IV Push every 6 hours    MEDICATIONS  (PRN):  acetaminophen  Suppository .. 650 milliGRAM(s) Rectal every 6 hours PRN Temp greater or equal to 38C (100.4F)  hydrALAZINE Injectable 10 milliGRAM(s) IV Push every 8 hours PRN SBP> 160      Allergies    morphine (Pruritus)    Intolerances        REVIEW OF SYSTEMS: Negative except as per HPI.    VITAL SIGNS:   Vital Signs Last 24 Hrs  T(C): 38.8 (29 Apr 2021 05:37), Max: 39.2 (28 Apr 2021 22:59)  T(F): 101.9 (29 Apr 2021 05:37), Max: 102.5 (28 Apr 2021 22:59)  HR: 81 (29 Apr 2021 04:40) (71 - 84)  BP: 160/66 (29 Apr 2021 04:40) (160/66 - 232/100)  BP(mean): --  RR: 18 (29 Apr 2021 04:40) (16 - 22)  SpO2: 96% (29 Apr 2021 04:40) (96% - 98%)    I&O's Summary    28 Apr 2021 07:01  -  29 Apr 2021 07:00  --------------------------------------------------------  IN: 0 mL / OUT: 500 mL / NET: -500 mL        PHYSICAL EXAM:  Constitutional: NAD  HEENT moist mucous membranes  Pulmonary: Non-labored, breath sounds are clear bilaterally, no wheezing, rales or rhonchi  Cardiovascular: +S1, S2, RRR, no murmur  Gastrointestinal: Soft, nontender, nondistended, normoactive bowel sounds  Extremities: No peripheral edema   Neurological: Alert, no verbal, follow simple commands       LABS: All Labs Reviewed:                        12.2   11.77 )-----------( 194      ( 29 Apr 2021 00:00 )             37.2                         11.5   6.87  )-----------( 197      ( 28 Apr 2021 17:40 )             35.1     29 Apr 2021 00:00    140    |  106    |  24     ----------------------------<  247    3.8     |  25     |  1.40   28 Apr 2021 17:40    138    |  104    |  29     ----------------------------<  168    4.1     |  32     |  1.40     Ca    8.8        29 Apr 2021 00:00  Ca    8.0        28 Apr 2021 17:40    TPro  7.5    /  Alb  4.0    /  TBili  0.5    /  DBili  x      /  AST  27     /  ALT  35     /  AlkPhos  82     29 Apr 2021 00:00  TPro  6.7    /  Alb  3.6    /  TBili  0.2    /  DBili  x      /  AST  27     /  ALT  38     /  AlkPhos  113    28 Apr 2021 17:40    PT/INR - ( 28 Apr 2021 17:40 )   PT: 11.8 sec;   INR: 1.01 ratio         PTT - ( 28 Apr 2021 17:40 )  PTT:29.4 sec      Blood Culture:     04-28 @ 21:24  TSH: 1.91      EKG:    RADIOLOGY:    CXR:   [facility]         Tre Severino, Camryn, Moris, Yamileth, Jeri Wolfe        [phone] (office)    Reason for Consult:    Interval Cardio: 74 y/o female with PMHx HTN, HLD, DMT2 presented to ED earlier today with AMS, aphasia witnessed by  at home, admitted for HTN urgency and AMS workup. Patient seen and examined at bedside, awake, disoriented and confused, unable to follow simple commands. Patient has been with febrile with T-max of 102.1 overnight, fever work up done.   Unable to obtain meaningful history given patient medical condition.     EKG NSR @ 84 bpm, ST and T waves abnormality on lateral leads   Telemetry no events overnight       PAST MEDICAL & SURGICAL HISTORY:  HTN (hypertension)    Hyperlipidemia    DM (diabetes mellitus)    Osteoarthritis    Anal cancer    Dysphagia    Tubal ligation status    Ectopic pregnancy    S/P hip replacement  right hip replacement    S/P tonsillectomy        SOCIAL HISTORY: No active tobacco, alcohol or illicit drug use    FAMILY HISTORY:  Family history of prostate cancer (Father, Mother)    Family history of diabetes mellitus (DM) (Mother)    Family history of hypertension (Mother)        Home Medications:  alendronate 70 mg oral tablet: 1 tab(s) orally once a week (28 Apr 2021 20:07)  Lantus Solostar Pen 100 units/mL subcutaneous solution: 34 unit(s) subcutaneous once a day    *as per spouse, pt may not be compliant on this regimen. (28 Apr 2021 20:07)  Lexapro 10 mg oral tablet: 1 tab(s) orally once a day (28 Apr 2021 20:07)  lisinopril 10 mg oral tablet: 1 tab(s) orally once a day (28 Apr 2021 20:07)  metFORMIN 750 mg oral tablet, extended release: 2 tab(s) orally once a day (28 Apr 2021 20:07)  NovoLIN 70/30 FlexPen subcutaneous suspension: 20 units subcutaneously at breakfast  15 units subcutaneously at dinner (28 Apr 2021 20:07)  pravastatin 40 mg oral tablet: 1 tab(s) orally once a day (28 Apr 2021 20:07)      MEDICATIONS  (STANDING):  aspirin Suppository 300 milliGRAM(s) Rectal daily  dextrose 40% Gel 15 Gram(s) Oral once  dextrose 50% Injectable 25 Gram(s) IV Push once  dextrose 50% Injectable 12.5 Gram(s) IV Push once  dextrose 50% Injectable 25 Gram(s) IV Push once  glucagon  Injectable 1 milliGRAM(s) IntraMuscular once  heparin   Injectable 5000 Unit(s) SubCutaneous every 8 hours  hydrALAZINE Injectable 10 milliGRAM(s) IV Push once  insulin glargine Injectable (LANTUS) 35 Unit(s) SubCutaneous at bedtime  insulin lispro (ADMELOG) corrective regimen sliding scale   SubCutaneous three times a day before meals  insulin lispro (ADMELOG) corrective regimen sliding scale   SubCutaneous at bedtime  metoprolol tartrate Injectable 1.25 milliGRAM(s) IV Push every 6 hours    MEDICATIONS  (PRN):  acetaminophen  Suppository .. 650 milliGRAM(s) Rectal every 6 hours PRN Temp greater or equal to 38C (100.4F)  hydrALAZINE Injectable 10 milliGRAM(s) IV Push every 8 hours PRN SBP> 160      Allergies    morphine (Pruritus)    Intolerances        REVIEW OF SYSTEMS: Negative except as per HPI.    VITAL SIGNS:   Vital Signs Last 24 Hrs  T(C): 38.8 (29 Apr 2021 05:37), Max: 39.2 (28 Apr 2021 22:59)  T(F): 101.9 (29 Apr 2021 05:37), Max: 102.5 (28 Apr 2021 22:59)  HR: 81 (29 Apr 2021 04:40) (71 - 84)  BP: 160/66 (29 Apr 2021 04:40) (160/66 - 232/100)  BP(mean): --  RR: 18 (29 Apr 2021 04:40) (16 - 22)  SpO2: 96% (29 Apr 2021 04:40) (96% - 98%)    I&O's Summary    28 Apr 2021 07:01  -  29 Apr 2021 07:00  --------------------------------------------------------  IN: 0 mL / OUT: 500 mL / NET: -500 mL        PHYSICAL EXAM:  Constitutional: NAD  HEENT moist mucous membranes  Pulmonary: Non-labored, breath sounds are clear bilaterally, no wheezing, rales or rhonchi  Cardiovascular: +S1, S2, RRR, no murmur  Gastrointestinal: Soft, nontender, nondistended, normoactive bowel sounds  Extremities: No peripheral edema   Neurological: Alert, no verbal, confused, unable to follow simple commands       LABS: All Labs Reviewed:                        12.2   11.77 )-----------( 194      ( 29 Apr 2021 00:00 )             37.2                         11.5   6.87  )-----------( 197      ( 28 Apr 2021 17:40 )             35.1     29 Apr 2021 00:00    140    |  106    |  24     ----------------------------<  247    3.8     |  25     |  1.40   28 Apr 2021 17:40    138    |  104    |  29     ----------------------------<  168    4.1     |  32     |  1.40     Ca    8.8        29 Apr 2021 00:00  Ca    8.0        28 Apr 2021 17:40    TPro  7.5    /  Alb  4.0    /  TBili  0.5    /  DBili  x      /  AST  27     /  ALT  35     /  AlkPhos  82     29 Apr 2021 00:00  TPro  6.7    /  Alb  3.6    /  TBili  0.2    /  DBili  x      /  AST  27     /  ALT  38     /  AlkPhos  113    28 Apr 2021 17:40    PT/INR - ( 28 Apr 2021 17:40 )   PT: 11.8 sec;   INR: 1.01 ratio         PTT - ( 28 Apr 2021 17:40 )  PTT:29.4 sec      Blood Culture:     04-28 @ 21:24  TSH: 1.91     [facility]         Tre Severino, Camryn, Moris, Yamileth, Jeri Wolfe        [phone] (office)    Reason for Consult:   HPI limited 2/2 AMS.   Interval Cardio: 76 y/o female with PMHx HTN, HLD, DMT2 presented to ED earlier today with AMS, aphasia witnessed by  at home, admitted for HTN urgency and AMS workup. Patient seen and examined at bedside, awake, disoriented and confused, unable to follow simple commands. Patient has been with febrile with T-max of 102.1 overnight, fever work up done. Unable to obtain meaningful history given patient medical condition.     EKG NSR @ 84 bpm, nonspecific ST/T changes  Telemetry no events overnight       PAST MEDICAL & SURGICAL HISTORY:  HTN (hypertension)    Hyperlipidemia    DM (diabetes mellitus)    Osteoarthritis    Anal cancer    Dysphagia    Tubal ligation status    Ectopic pregnancy    S/P hip replacement  right hip replacement    S/P tonsillectomy        SOCIAL HISTORY: No active tobacco, alcohol or illicit drug use reported    FAMILY HISTORY:  Family history of prostate cancer (Father, Mother)    Family history of diabetes mellitus (DM) (Mother)    Family history of hypertension (Mother)        Home Medications:  alendronate 70 mg oral tablet: 1 tab(s) orally once a week (28 Apr 2021 20:07)  Lantus Solostar Pen 100 units/mL subcutaneous solution: 34 unit(s) subcutaneous once a day    *as per spouse, pt may not be compliant on this regimen. (28 Apr 2021 20:07)  Lexapro 10 mg oral tablet: 1 tab(s) orally once a day (28 Apr 2021 20:07)  lisinopril 10 mg oral tablet: 1 tab(s) orally once a day (28 Apr 2021 20:07)  metFORMIN 750 mg oral tablet, extended release: 2 tab(s) orally once a day (28 Apr 2021 20:07)  NovoLIN 70/30 FlexPen subcutaneous suspension: 20 units subcutaneously at breakfast  15 units subcutaneously at dinner (28 Apr 2021 20:07)  pravastatin 40 mg oral tablet: 1 tab(s) orally once a day (28 Apr 2021 20:07)      MEDICATIONS  (STANDING):  aspirin Suppository 300 milliGRAM(s) Rectal daily  dextrose 40% Gel 15 Gram(s) Oral once  dextrose 50% Injectable 25 Gram(s) IV Push once  dextrose 50% Injectable 12.5 Gram(s) IV Push once  dextrose 50% Injectable 25 Gram(s) IV Push once  glucagon  Injectable 1 milliGRAM(s) IntraMuscular once  heparin   Injectable 5000 Unit(s) SubCutaneous every 8 hours  hydrALAZINE Injectable 10 milliGRAM(s) IV Push once  insulin glargine Injectable (LANTUS) 35 Unit(s) SubCutaneous at bedtime  insulin lispro (ADMELOG) corrective regimen sliding scale   SubCutaneous three times a day before meals  insulin lispro (ADMELOG) corrective regimen sliding scale   SubCutaneous at bedtime  metoprolol tartrate Injectable 1.25 milliGRAM(s) IV Push every 6 hours    MEDICATIONS  (PRN):  acetaminophen  Suppository .. 650 milliGRAM(s) Rectal every 6 hours PRN Temp greater or equal to 38C (100.4F)  hydrALAZINE Injectable 10 milliGRAM(s) IV Push every 8 hours PRN SBP> 160      Allergies    morphine (Pruritus)    Intolerances        REVIEW OF SYSTEMS: Negative except as per HPI.    VITAL SIGNS:   Vital Signs Last 24 Hrs  T(C): 38.8 (29 Apr 2021 05:37), Max: 39.2 (28 Apr 2021 22:59)  T(F): 101.9 (29 Apr 2021 05:37), Max: 102.5 (28 Apr 2021 22:59)  HR: 81 (29 Apr 2021 04:40) (71 - 84)  BP: 160/66 (29 Apr 2021 04:40) (160/66 - 232/100)  BP(mean): --  RR: 18 (29 Apr 2021 04:40) (16 - 22)  SpO2: 96% (29 Apr 2021 04:40) (96% - 98%)    I&O's Summary    28 Apr 2021 07:01  -  29 Apr 2021 07:00  --------------------------------------------------------  IN: 0 mL / OUT: 500 mL / NET: -500 mL        PHYSICAL EXAM:  Constitutional: NAD, confused   HEENT moist mucous membranes anicteric  Pulmonary: Non-labored, breath sounds are clear bilaterally, no wheezing, rales or rhonchi  Cardiovascular: +S1, S2, RRR, no murmur  Gastrointestinal: Soft, nontender, nondistended, normoactive bowel sounds  lymph: No peripheral edema   Neurological: Alert, no verbal, confused, unable to follow simple commands       LABS: All Labs Reviewed:                        12.2   11.77 )-----------( 194      ( 29 Apr 2021 00:00 )             37.2                         11.5   6.87  )-----------( 197      ( 28 Apr 2021 17:40 )             35.1     29 Apr 2021 00:00    140    |  106    |  24     ----------------------------<  247    3.8     |  25     |  1.40   28 Apr 2021 17:40    138    |  104    |  29     ----------------------------<  168    4.1     |  32     |  1.40     Ca    8.8        29 Apr 2021 00:00  Ca    8.0        28 Apr 2021 17:40    TPro  7.5    /  Alb  4.0    /  TBili  0.5    /  DBili  x      /  AST  27     /  ALT  35     /  AlkPhos  82     29 Apr 2021 00:00  TPro  6.7    /  Alb  3.6    /  TBili  0.2    /  DBili  x      /  AST  27     /  ALT  38     /  AlkPhos  113    28 Apr 2021 17:40    PT/INR - ( 28 Apr 2021 17:40 )   PT: 11.8 sec;   INR: 1.01 ratio         PTT - ( 28 Apr 2021 17:40 )  PTT:29.4 sec      Blood Culture:     04-28 @ 21:24  TSH: 1.91

## 2021-04-29 NOTE — PATIENT PROFILE ADULT - VISION (WITH CORRECTIVE LENSES IF THE PATIENT USUALLY WEARS THEM):
Problem: Arrhythmia/Dysrhythmia (Symptomatic) (Adult)  Goal: Signs and Symptoms of Listed Potential Problems Will be Absent, Minimized or Managed (Arrhythmia/Dysrhythmia)  Signs and symptoms of listed potential problems will be absent, minimized or managed by discharge/transition of care (reference Arrhythmia/Dysrhythmia (Symptomatic) (Adult) CPG).  Outcome: Improving  Admitted with palpitations-telemetry Afib. Started on Dilt gtt-converted to SR approx. 00:30-dilt gtt continued at 5mg/hr until cardiology consult performed. Consult performed-pt started on low dose Metoprolol-Dilt gtt dc'd. Continue IMC status until change of shift at 15:00-at that time IMC orders will bd dc'd.        Normal vision: sees adequately in most situations; can see medication labels, newsprint

## 2021-04-29 NOTE — PHYSICAL THERAPY INITIAL EVALUATION ADULT - PERTINENT HX OF CURRENT PROBLEM, REHAB EVAL
74 y/o female with PMHx HTN, HLD, DMT2 presented to ED earlier today with AMS, aphasia witnessed by  at home, admitted for HTN urgency and AMS workup. Patient seen and examined at bedside, awake, no verbal, follow simple commands. Patient has been with febrile with T-max of 102.1 overnight, fever work up done. Suspected CVA allowing permissive HTN.

## 2021-04-29 NOTE — OCCUPATIONAL THERAPY INITIAL EVALUATION ADULT - COGNITIVE, VISUAL PERCEPTUAL, OT EVAL
Further assess cognition and enable patient to follow simple commands 75% of the time with cueing in 3-5 sessions.

## 2021-04-29 NOTE — DIETITIAN INITIAL EVALUATION ADULT. - ORAL INTAKE PTA/DIET HISTORY
Pt's  reports pt with good appetite and PO intake PTA. States that the pt has DM but is in denial and does not really follow any type of diet to help control BG levels, consumes whatever she wants or feels is good for her.  states that he thinks the pt has decline in her cognitive skills recently which may have lead to her not taking her diabetes medication. Pt is on Metofrmin, Lantus, Novolin PTA, usual checks BG everyday, current HgbA1c 7.7%, elevated. NKFA.

## 2021-04-30 ENCOUNTER — TRANSCRIPTION ENCOUNTER (OUTPATIENT)
Age: 76
End: 2021-04-30

## 2021-04-30 VITALS
HEART RATE: 71 BPM | DIASTOLIC BLOOD PRESSURE: 84 MMHG | RESPIRATION RATE: 19 BRPM | TEMPERATURE: 98 F | OXYGEN SATURATION: 93 % | SYSTOLIC BLOOD PRESSURE: 144 MMHG

## 2021-04-30 LAB
A1C WITH ESTIMATED AVERAGE GLUCOSE RESULT: 8 % — HIGH (ref 4–5.6)
AMMONIA BLD-MCNC: 27 UMOL/L — SIGNIFICANT CHANGE UP (ref 11–32)
ANION GAP SERPL CALC-SCNC: 6 MMOL/L — SIGNIFICANT CHANGE UP (ref 5–17)
BUN SERPL-MCNC: 32 MG/DL — HIGH (ref 7–23)
CALCIUM SERPL-MCNC: 8.6 MG/DL — SIGNIFICANT CHANGE UP (ref 8.5–10.1)
CHLORIDE SERPL-SCNC: 109 MMOL/L — HIGH (ref 96–108)
CHOLEST SERPL-MCNC: 213 MG/DL — HIGH
CO2 SERPL-SCNC: 27 MMOL/L — SIGNIFICANT CHANGE UP (ref 22–31)
CREAT SERPL-MCNC: 1.5 MG/DL — HIGH (ref 0.5–1.3)
CRP SERPL-MCNC: 7 MG/L — HIGH
CULTURE RESULTS: NO GROWTH — SIGNIFICANT CHANGE UP
ERYTHROCYTE [SEDIMENTATION RATE] IN BLOOD: 28 MM/HR — HIGH (ref 0–20)
ESTIMATED AVERAGE GLUCOSE: 183 MG/DL — HIGH (ref 68–114)
FOLATE SERPL-MCNC: >20 NG/ML — SIGNIFICANT CHANGE UP
GLUCOSE SERPL-MCNC: 123 MG/DL — HIGH (ref 70–99)
HCT VFR BLD CALC: 35.7 % — SIGNIFICANT CHANGE UP (ref 34.5–45)
HCT VFR BLD CALC: 35.8 % — SIGNIFICANT CHANGE UP (ref 34.5–45)
HDLC SERPL-MCNC: 52 MG/DL — SIGNIFICANT CHANGE UP
HGB BLD-MCNC: 11.3 G/DL — LOW (ref 11.5–15.5)
HGB BLD-MCNC: 11.6 G/DL — SIGNIFICANT CHANGE UP (ref 11.5–15.5)
LIPID PNL WITH DIRECT LDL SERPL: 113 MG/DL — HIGH
MCHC RBC-ENTMCNC: 28.5 PG — SIGNIFICANT CHANGE UP (ref 27–34)
MCHC RBC-ENTMCNC: 29 PG — SIGNIFICANT CHANGE UP (ref 27–34)
MCHC RBC-ENTMCNC: 31.7 GM/DL — LOW (ref 32–36)
MCHC RBC-ENTMCNC: 32.4 GM/DL — SIGNIFICANT CHANGE UP (ref 32–36)
MCV RBC AUTO: 89.5 FL — SIGNIFICANT CHANGE UP (ref 80–100)
MCV RBC AUTO: 89.9 FL — SIGNIFICANT CHANGE UP (ref 80–100)
NON HDL CHOLESTEROL: 162 MG/DL — HIGH
NRBC # BLD: 0 /100 WBCS — SIGNIFICANT CHANGE UP (ref 0–0)
NRBC # BLD: 0 /100 WBCS — SIGNIFICANT CHANGE UP (ref 0–0)
PLATELET # BLD AUTO: 206 K/UL — SIGNIFICANT CHANGE UP (ref 150–400)
PLATELET # BLD AUTO: 224 K/UL — SIGNIFICANT CHANGE UP (ref 150–400)
POTASSIUM SERPL-MCNC: 3.8 MMOL/L — SIGNIFICANT CHANGE UP (ref 3.5–5.3)
POTASSIUM SERPL-SCNC: 3.8 MMOL/L — SIGNIFICANT CHANGE UP (ref 3.5–5.3)
RBC # BLD: 3.97 M/UL — SIGNIFICANT CHANGE UP (ref 3.8–5.2)
RBC # BLD: 4 M/UL — SIGNIFICANT CHANGE UP (ref 3.8–5.2)
RBC # FLD: 13.2 % — SIGNIFICANT CHANGE UP (ref 10.3–14.5)
RBC # FLD: 13.2 % — SIGNIFICANT CHANGE UP (ref 10.3–14.5)
SODIUM SERPL-SCNC: 142 MMOL/L — SIGNIFICANT CHANGE UP (ref 135–145)
SPECIMEN SOURCE: SIGNIFICANT CHANGE UP
TRIGL SERPL-MCNC: 243 MG/DL — HIGH
VIT B12 SERPL-MCNC: 1484 PG/ML — HIGH (ref 232–1245)
WBC # BLD: 10.05 K/UL — SIGNIFICANT CHANGE UP (ref 3.8–10.5)
WBC # BLD: 10.2 K/UL — SIGNIFICANT CHANGE UP (ref 3.8–10.5)
WBC # FLD AUTO: 10.05 K/UL — SIGNIFICANT CHANGE UP (ref 3.8–10.5)
WBC # FLD AUTO: 10.2 K/UL — SIGNIFICANT CHANGE UP (ref 3.8–10.5)

## 2021-04-30 PROCEDURE — 87086 URINE CULTURE/COLONY COUNT: CPT

## 2021-04-30 PROCEDURE — 96374 THER/PROPH/DIAG INJ IV PUSH: CPT

## 2021-04-30 PROCEDURE — 85025 COMPLETE CBC W/AUTO DIFF WBC: CPT

## 2021-04-30 PROCEDURE — 99285 EMERGENCY DEPT VISIT HI MDM: CPT | Mod: 25

## 2021-04-30 PROCEDURE — 93306 TTE W/DOPPLER COMPLETE: CPT

## 2021-04-30 PROCEDURE — 70553 MRI BRAIN STEM W/O & W/DYE: CPT

## 2021-04-30 PROCEDURE — 36415 COLL VENOUS BLD VENIPUNCTURE: CPT

## 2021-04-30 PROCEDURE — 97535 SELF CARE MNGMENT TRAINING: CPT

## 2021-04-30 PROCEDURE — 85027 COMPLETE CBC AUTOMATED: CPT

## 2021-04-30 PROCEDURE — U0003: CPT

## 2021-04-30 PROCEDURE — 99232 SBSQ HOSP IP/OBS MODERATE 35: CPT

## 2021-04-30 PROCEDURE — 97162 PT EVAL MOD COMPLEX 30 MIN: CPT

## 2021-04-30 PROCEDURE — 70498 CT ANGIOGRAPHY NECK: CPT

## 2021-04-30 PROCEDURE — 82607 VITAMIN B-12: CPT

## 2021-04-30 PROCEDURE — 71250 CT THORAX DX C-: CPT

## 2021-04-30 PROCEDURE — 82962 GLUCOSE BLOOD TEST: CPT

## 2021-04-30 PROCEDURE — 86803 HEPATITIS C AB TEST: CPT

## 2021-04-30 PROCEDURE — 92610 EVALUATE SWALLOWING FUNCTION: CPT

## 2021-04-30 PROCEDURE — 85652 RBC SED RATE AUTOMATED: CPT

## 2021-04-30 PROCEDURE — 86038 ANTINUCLEAR ANTIBODIES: CPT

## 2021-04-30 PROCEDURE — 97116 GAIT TRAINING THERAPY: CPT

## 2021-04-30 PROCEDURE — 84443 ASSAY THYROID STIM HORMONE: CPT

## 2021-04-30 PROCEDURE — 85730 THROMBOPLASTIN TIME PARTIAL: CPT

## 2021-04-30 PROCEDURE — 0042T: CPT

## 2021-04-30 PROCEDURE — 97530 THERAPEUTIC ACTIVITIES: CPT

## 2021-04-30 PROCEDURE — 83605 ASSAY OF LACTIC ACID: CPT

## 2021-04-30 PROCEDURE — A9579: CPT

## 2021-04-30 PROCEDURE — 82746 ASSAY OF FOLIC ACID SERUM: CPT

## 2021-04-30 PROCEDURE — 86769 SARS-COV-2 COVID-19 ANTIBODY: CPT

## 2021-04-30 PROCEDURE — U0005: CPT

## 2021-04-30 PROCEDURE — 86140 C-REACTIVE PROTEIN: CPT

## 2021-04-30 PROCEDURE — 85610 PROTHROMBIN TIME: CPT

## 2021-04-30 PROCEDURE — 80061 LIPID PANEL: CPT

## 2021-04-30 PROCEDURE — 83036 HEMOGLOBIN GLYCOSYLATED A1C: CPT

## 2021-04-30 PROCEDURE — 82140 ASSAY OF AMMONIA: CPT

## 2021-04-30 PROCEDURE — 70450 CT HEAD/BRAIN W/O DYE: CPT

## 2021-04-30 PROCEDURE — 80048 BASIC METABOLIC PNL TOTAL CA: CPT

## 2021-04-30 PROCEDURE — 93005 ELECTROCARDIOGRAM TRACING: CPT

## 2021-04-30 PROCEDURE — 97166 OT EVAL MOD COMPLEX 45 MIN: CPT

## 2021-04-30 PROCEDURE — 71045 X-RAY EXAM CHEST 1 VIEW: CPT

## 2021-04-30 PROCEDURE — 80053 COMPREHEN METABOLIC PANEL: CPT

## 2021-04-30 PROCEDURE — 0225U NFCT DS DNA&RNA 21 SARSCOV2: CPT

## 2021-04-30 PROCEDURE — 70496 CT ANGIOGRAPHY HEAD: CPT

## 2021-04-30 PROCEDURE — 81001 URINALYSIS AUTO W/SCOPE: CPT

## 2021-04-30 PROCEDURE — 84145 PROCALCITONIN (PCT): CPT

## 2021-04-30 PROCEDURE — 87040 BLOOD CULTURE FOR BACTERIA: CPT

## 2021-04-30 PROCEDURE — 92522 EVALUATE SPEECH PRODUCTION: CPT

## 2021-04-30 RX ORDER — ASPIRIN/CALCIUM CARB/MAGNESIUM 324 MG
1 TABLET ORAL
Qty: 30 | Refills: 0
Start: 2021-04-30 | End: 2021-05-29

## 2021-04-30 RX ADMIN — Medication 2: at 08:18

## 2021-04-30 RX ADMIN — Medication 300 MILLIGRAM(S): at 12:52

## 2021-04-30 RX ADMIN — HEPARIN SODIUM 5000 UNIT(S): 5000 INJECTION INTRAVENOUS; SUBCUTANEOUS at 13:35

## 2021-04-30 RX ADMIN — Medication 1.25 MILLIGRAM(S): at 12:51

## 2021-04-30 RX ADMIN — Medication 1.25 MILLIGRAM(S): at 05:48

## 2021-04-30 RX ADMIN — HEPARIN SODIUM 5000 UNIT(S): 5000 INJECTION INTRAVENOUS; SUBCUTANEOUS at 05:48

## 2021-04-30 NOTE — SPEECH LANGUAGE PATHOLOGY EVALUATION - SLP VERBAL EXPRESSION
Occasional word retrieval difficulties Intermittent word retrieval difficulties noted during structured and unstructured tasks

## 2021-04-30 NOTE — PROGRESS NOTE ADULT - SUBJECTIVE AND OBJECTIVE BOX
Neurology follow up note    ADITYA [id number]      Interval History:    Patient feels ok no new complaints.    MEDICATIONS    acetaminophen  Suppository .. 650 milliGRAM(s) Rectal every 6 hours PRN  aspirin Suppository 300 milliGRAM(s) Rectal daily  atorvastatin 80 milliGRAM(s) Oral at bedtime  dextrose 40% Gel 15 Gram(s) Oral once  dextrose 50% Injectable 25 Gram(s) IV Push once  dextrose 50% Injectable 12.5 Gram(s) IV Push once  dextrose 50% Injectable 25 Gram(s) IV Push once  glucagon  Injectable 1 milliGRAM(s) IntraMuscular once  heparin   Injectable 5000 Unit(s) SubCutaneous every 8 hours  hydrALAZINE Injectable 10 milliGRAM(s) IV Push every 8 hours PRN  hydrALAZINE Injectable 10 milliGRAM(s) IV Push once  insulin glargine Injectable (LANTUS) 35 Unit(s) SubCutaneous at bedtime  insulin lispro (ADMELOG) corrective regimen sliding scale   SubCutaneous three times a day before meals  insulin lispro (ADMELOG) corrective regimen sliding scale   SubCutaneous at bedtime  metoprolol tartrate Injectable 1.25 milliGRAM(s) IV Push every 6 hours      Allergies    morphine (Pruritus)    Intolerances            Vital Signs Last 24 Hrs  T(C): 36.8 (2021 12:10), Max: 37.6 (2021 20:02)  T(F): 98.3 (2021 12:10), Max: 99.6 (2021 20:02)  HR: 71 (2021 12:10) (69 - 80)  BP: 144/84 (2021 12:10) (136/61 - 167/68)  BP(mean): --  RR: 19 (2021 12:10) (18 - 19)  SpO2: 93% (2021 12:10) (93% - 98%)      REVIEW OF SYSTEMS:     Constitutional: No fever, chills, fatigue, weakness  Eyes: no eye pain, visual disturbances, or discharge  ENT:  No difficulty hearing, tinnitus, vertigo; No sinus or throat pain  Neck: No pain or stiffness  Respiratory: No cough, dyspnea, wheezing   Cardiovascular: No chest pain, palpitations,   Gastrointestinal: No abdominal or epigastric pain. No nausea, vomiting  No diarrhea or constipation.   Genitourinary: No dysuria, frequency, hematuria or incontinence  Neurological: No headaches, lightheadedness, vertigo, numbness or tremors  Psychiatric: No depression, anxiety, mood swings or difficulty sleeping  Musculoskeletal: No joint pain or swelling; No muscle, back or extremity pain  Skin: No itching, burning, rashes or lesions   Lymph Nodes: No enlarged glands  Endocrine: No heat or cold intolerance; No hair loss   Allergy and Immunologic: No hives or eczema    On Neurological Examination:    Mental Status - Patient is alert, awake, oriented X3.      Follow simple commands  Follow complex commands    Speech -   Fluent      Cranial Nerves - Pupils 3 mm equal and reactive to light,   extraocular eye movements intact.   smile symmetric  intact bilateral NLF    Motor Exam -   Right upper 5/5  Left upper 5/5  Right lower 5/5  Left lower  5/5    Muscle tone - is normal all over.  No asymmetry is seen.      Sensory    Bilateral intact to light touch    GENERAL Exam: Nontoxic , No Acute Distress   	  HEENT:  normocephalic, atraumatic  		  LUNGS: Clear bilaterally    	  HEART: Normal S1S2   No murmur RRR        	  GI/ ABDOMEN:  Soft  Non tender    EXTREMITIES:   No Edema  No Clubbing  No Cyanosis     MUSCULOSKELETAL: Normal Range of Motion  	   SKIN: Normal  No Ecchymosis               LABS:  CBC Full  -  ( 2021 11:57 )  WBC Count : 10.20 K/uL  RBC Count : 4.00 M/uL  Hemoglobin : 11.6 g/dL  Hematocrit : 35.8 %  Platelet Count - Automated : 206 K/uL  Mean Cell Volume : 89.5 fl  Mean Cell Hemoglobin : 29.0 pg  Mean Cell Hemoglobin Concentration : 32.4 gm/dL  Auto Neutrophil # : x  Auto Lymphocyte # : x  Auto Monocyte # : x  Auto Eosinophil # : x  Auto Basophil # : x  Auto Neutrophil % : x  Auto Lymphocyte % : x  Auto Monocyte % : x  Auto Eosinophil % : x  Auto Basophil % : x    Urinalysis Basic - ( 2021 07:24 )    Color: Yellow / Appearance: Clear / S.020 / pH: x  Gluc: x / Ketone: Negative  / Bili: Negative / Urobili: Negative   Blood: x / Protein: 100 / Nitrite: Negative   Leuk Esterase: Negative / RBC: 0-2 /HPF / WBC 3-5   Sq Epi: x / Non Sq Epi: Occasional / Bacteria: Moderate      04-30    142  |  109<H>  |  32<H>  ----------------------------<  123<H>  3.8   |  27  |  1.50<H>    Ca    8.6      2021 11:03    TPro  7.5  /  Alb  4.0  /  TBili  0.5  /  DBili  x   /  AST  27  /  ALT  35  /  AlkPhos  82      Hemoglobin A1C:   Lipid Panel  @ 08:48  Total Cholesterol, Serum 213  LDL --  Triglycerides 243    LIVER FUNCTIONS - ( 2021 00:00 )  Alb: 4.0 g/dL / Pro: 7.5 g/dL / ALK PHOS: 82 U/L / ALT: 35 U/L / AST: 27 U/L / GGT: x           Vitamin B12 Vitamin B12, Serum: 1484 pg/mL ( @ 08:46)    PT/INR - ( 2021 17:40 )   PT: 11.8 sec;   INR: 1.01 ratio         PTT - ( 2021 17:40 )  PTT:29.4 sec      RADIOLOGY    ANALYSIS AND PLAN:  A 75-year-old with expressive aphasia and right hemiparesis.  Clinical impression for expressive aphasia, AMS unclear etiology possible form fevers vs TIA   MRI and MRA imaging of the brain with and without contrast  was normal .  Patient over to aspirin 325 mg once a day for two weeks, then can cut down to 81 mg.  We will plan for high-dose statin.  For history of diabetes, would recommend strict control of blood sugars.  For history of hypertension  For underlying anxiety and depression, we will continue the patient on home psychiatric medications.  Fall precaution.  no driving   For memory impairment, most likely underlying mild cognitive impairment    Spoke with the spouse, Sid, at [phone], alternate number is [phone] 2021    Greater than 45 minutes of time was spent with the patient, plan of care, reviewing data, with greater than  50% of the visit was spent counseling and/or coordinating care with multidisciplinary healthcare team      
CAPILLARY BLOOD GLUCOSE      POCT Blood Glucose.: 170 mg/dL (29 Apr 2021 22:09)  POCT Blood Glucose.: 160 mg/dL (29 Apr 2021 21:11)  POCT Blood Glucose.: 145 mg/dL (29 Apr 2021 16:52)  POCT Blood Glucose.: 210 mg/dL (29 Apr 2021 12:30)  POCT Blood Glucose.: 183 mg/dL (29 Apr 2021 07:54)      Vital Signs Last 24 Hrs  T(C): 37.1 (30 Apr 2021 04:44), Max: 37.7 (29 Apr 2021 09:59)  T(F): 98.7 (30 Apr 2021 04:44), Max: 99.8 (29 Apr 2021 09:59)  HR: 69 (30 Apr 2021 04:44) (69 - 82)  BP: 136/61 (30 Apr 2021 04:44) (136/61 - 167/68)  BP(mean): --  RR: 18 (30 Apr 2021 04:44) (18 - 18)  SpO2: 96% (30 Apr 2021 04:44) (93% - 97%)    General: WN/WD NAD  Respiratory: CTA B/L  CV: RRR, S1S2, no murmurs, rubs or gallops  Abdominal: Soft, NT, ND +BS, Last BM  Extremities: No edema, + peripheral pulses     04-29    140  |  106  |  24<H>  ----------------------------<  247<H>  3.8   |  25  |  1.40<H>    Ca    8.8      29 Apr 2021 00:00    TPro  7.5  /  Alb  4.0  /  TBili  0.5  /  DBili  x   /  AST  27  /  ALT  35  /  AlkPhos  82  04-29      atorvastatin 80 milliGRAM(s) Oral at bedtime  dextrose 40% Gel 15 Gram(s) Oral once  dextrose 50% Injectable 25 Gram(s) IV Push once  dextrose 50% Injectable 12.5 Gram(s) IV Push once  dextrose 50% Injectable 25 Gram(s) IV Push once  glucagon  Injectable 1 milliGRAM(s) IntraMuscular once  insulin glargine Injectable (LANTUS) 35 Unit(s) SubCutaneous at bedtime  insulin lispro (ADMELOG) corrective regimen sliding scale   SubCutaneous three times a day before meals  insulin lispro (ADMELOG) corrective regimen sliding scale   SubCutaneous at bedtime  
[facility] DIVISION of INFECTIOUS DISEASE  Roscoe Zaman MD PhD, Glory Chapa MD, Dori Howard MD, Brenton Wolfe MD  and providing coverage with Ana Manzano MD and Stew Joshi MD  Providing Infectious Disease Consultations at [facility], [facility], [facility]    Office# [phone] to schedule follow up appointments  Answering Service for urgent calls or New Consults [phone]  Cell# to text for urgent issues Roscoe Zaman [phone]     infectious diseases progress note:    ADITYA CHAVEZ is a 75y y. o. Female patient    No concerning overnight events    Allergies    morphine (Pruritus)    Intolerances        ANTIBIOTICS/RELEVANT:  antimicrobials    immunologic:    OTHER:  acetaminophen  Suppository .. 650 milliGRAM(s) Rectal every 6 hours PRN  aspirin Suppository 300 milliGRAM(s) Rectal daily  atorvastatin 80 milliGRAM(s) Oral at bedtime  dextrose 40% Gel 15 Gram(s) Oral once  dextrose 50% Injectable 25 Gram(s) IV Push once  dextrose 50% Injectable 12.5 Gram(s) IV Push once  dextrose 50% Injectable 25 Gram(s) IV Push once  glucagon  Injectable 1 milliGRAM(s) IntraMuscular once  heparin   Injectable 5000 Unit(s) SubCutaneous every 8 hours  hydrALAZINE Injectable 10 milliGRAM(s) IV Push every 8 hours PRN  hydrALAZINE Injectable 10 milliGRAM(s) IV Push once  insulin glargine Injectable (LANTUS) 35 Unit(s) SubCutaneous at bedtime  insulin lispro (ADMELOG) corrective regimen sliding scale   SubCutaneous three times a day before meals  insulin lispro (ADMELOG) corrective regimen sliding scale   SubCutaneous at bedtime  metoprolol tartrate Injectable 1.25 milliGRAM(s) IV Push every 6 hours      Objective:  Vital Signs Last 24 Hrs  T(C): 36.8 (2021 12:10), Max: 37.6 (2021 20:02)  T(F): 98.3 (2021 12:10), Max: 99.6 (2021 20:02)  HR: 71 (2021 12:10) (69 - 80)  BP: 144/84 (2021 12:10) (136/61 - 167/68)  BP(mean): --  RR: 19 (2021 12:10) (18 - 19)  SpO2: 93% (2021 12:10) (93% - 98%)    T(C): 36.8 (21 @ 12:10), Max: 39.2 (21 @ 22:59)  T(C): 36.8 (21 @ 12:10), Max: 39.2 (21 @ 22:59)  T(C): 36.8 (21 @ 12:10), Max: 39.2 (21 @ 22:59)    PHYSICAL EXAM:  HEENT: NC atraumatic  Neck: supple  Respiratory: no accessory muscle use, breathing comfortably  Cardiovascular: distant  Gastrointestinal: normal appearing, nondistended  Extremities: no clubbing, no cyanosis,  Neuro: pt back to baseline, very clear and lucid      LABS:                          11.6   10.20 )-----------( 206      ( 2021 11:57 )             35.8       10.20  @ 11:57  10.05  @ 11:03  11.77  @ 00:00  6.87  @ 17:40          142  |  109<H>  |  32<H>  ----------------------------<  123<H>  3.8   |  27  |  1.50<H>    Ca    8.6      2021 11:03    TPro  7.5  /  Alb  4.0  /  TBili  0.5  /  DBili  x   /  AST  27  /  ALT  35  /  AlkPhos  82        Creatinine, Serum: 1.50 mg/dL (21 @ 11:03)  Creatinine, Serum: 1.40 mg/dL (21 @ 00:00)  Creatinine, Serum: 1.40 mg/dL (21 @ 17:40)      PT/INR - ( 2021 17:40 )   PT: 11.8 sec;   INR: 1.01 ratio         PTT - ( 2021 17:40 )  PTT:29.4 sec  Urinalysis Basic - ( 2021 07:24 )    Color: Yellow / Appearance: Clear / S.020 / pH: x  Gluc: x / Ketone: Negative  / Bili: Negative / Urobili: Negative   Blood: x / Protein: 100 / Nitrite: Negative   Leuk Esterase: Negative / RBC: 0-2 /HPF / WBC 3-5   Sq Epi: x / Non Sq Epi: Occasional / Bacteria: Moderate            INFLAMMATORY MARKERS  Auto Neutrophil #: 10.70 K/uL (21 @ 00:00)  Auto Lymphocyte #: 0.75 K/uL (21 @ 00:00)  Auto Neutrophil #: 5.10 K/uL (21 @ 17:40)  Auto Lymphocyte #: 0.96 K/uL (21 @ 17:40)    Lactate, Blood: 2.3 mmol/L (21 @ 00:00)  Lactate, Blood: 0.5 mmol/L (21 @ 17:40)    Auto Eosinophil #: 0.01 K/uL (21 @ 00:00)  Auto Eosinophil #: 0.13 K/uL (21 @ 17:40)        Procalcitonin, Serum: <0.05 ng/mL (21 @ 00:00)      Activated Partial Thromboplastin Time: 29.4 sec (21 @ 17:40)  INR: 1.01 ratio (21 @ 17:40)          MICROBIOLOGY:              RADIOLOGY & ADDITIONAL STUDIES:  
[facility] -- Tre Severino, Moris Cowan, Aiden Carson Savella, [place]: Office # [phone]    Follow Up:  AMS    Subjective/Observations: Patient seen and examined. Patient awake, alert, resting comfortably in bed. Following commands, answering questions and reports aphasia is improving. No complaints of chest pain, dyspnea, palpitations or dizziness.  Tolerating room air.     REVIEW OF SYSTEMS: All review of systems is negative for eye, ENT, GI, , allergic, dermatologic, musculoskeletal and neurologic except as described above    PAST MEDICAL & SURGICAL HISTORY:  HTN (hypertension)    Hyperlipidemia    DM (diabetes mellitus)    Osteoarthritis    Anal cancer    Dysphagia    Tubal ligation status    Ectopic pregnancy    S/P hip replacement  right hip replacement    S/P tonsillectomy        MEDICATIONS  (STANDING):  aspirin Suppository 300 milliGRAM(s) Rectal daily  atorvastatin 80 milliGRAM(s) Oral at bedtime  dextrose 40% Gel 15 Gram(s) Oral once  dextrose 50% Injectable 25 Gram(s) IV Push once  dextrose 50% Injectable 12.5 Gram(s) IV Push once  dextrose 50% Injectable 25 Gram(s) IV Push once  glucagon  Injectable 1 milliGRAM(s) IntraMuscular once  heparin   Injectable 5000 Unit(s) SubCutaneous every 8 hours  hydrALAZINE Injectable 10 milliGRAM(s) IV Push once  insulin glargine Injectable (LANTUS) 35 Unit(s) SubCutaneous at bedtime  insulin lispro (ADMELOG) corrective regimen sliding scale   SubCutaneous three times a day before meals  insulin lispro (ADMELOG) corrective regimen sliding scale   SubCutaneous at bedtime  metoprolol tartrate Injectable 1.25 milliGRAM(s) IV Push every 6 hours    MEDICATIONS  (PRN):  acetaminophen  Suppository .. 650 milliGRAM(s) Rectal every 6 hours PRN Temp greater or equal to 38C (100.4F)  hydrALAZINE Injectable 10 milliGRAM(s) IV Push every 8 hours PRN SBP> 160    Allergies    morphine (Pruritus)    Intolerances      Vital Signs Last 24 Hrs  T(C): 37.1 (30 Apr 2021 04:44), Max: 37.6 (29 Apr 2021 20:02)  T(F): 98.7 (30 Apr 2021 04:44), Max: 99.6 (29 Apr 2021 20:02)  HR: 69 (30 Apr 2021 04:44) (69 - 80)  BP: 164/72 (30 Apr 2021 09:40) (136/61 - 167/68)  BP(mean): --  RR: 18 (30 Apr 2021 04:44) (18 - 18)  SpO2: 98% (30 Apr 2021 09:40) (93% - 98%)  I&O's Summary    29 Apr 2021 07:01  -  30 Apr 2021 07:00  --------------------------------------------------------  IN: 0 mL / OUT: 1475 mL / NET: -1475 mL          TELE:  70s  PHYSICAL EXAM:  Appearance: NAD, no distress, alert, Well developed   HEENT: Moist Mucous Membranes, Anicteric  Cardiovascular: Regular rate and rhythm, Normal S1 S2, No JVD, No murmurs, No rubs, gallops or clicks  Respiratory: Non-labored, Clear to auscultation, No rales, No rhonchi, No wheezing.   Gastrointestinal:  Soft, Non-tender, + BS  Skin: Warm and dry, No visible rashes or ulcers, No ecchymosis, No cyanosis  Musculoskeletal: No clubbing, No cyanosis, No joint swelling/tenderness  Psychiatry: Mood & affect appropriate  Lymph: No peripheral edema.     LABS: All Labs Reviewed:                        12.2   11.77 )-----------( 194      ( 29 Apr 2021 00:00 )             37.2                         11.5   6.87  )-----------( 197      ( 28 Apr 2021 17:40 )             35.1     29 Apr 2021 00:00    140    |  106    |  24     ----------------------------<  247    3.8     |  25     |  1.40   28 Apr 2021 17:40    138    |  104    |  29     ----------------------------<  168    4.1     |  32     |  1.40     Ca    8.8        29 Apr 2021 00:00  Ca    8.0        28 Apr 2021 17:40    TPro  7.5    /  Alb  4.0    /  TBili  0.5    /  DBili  x      /  AST  27     /  ALT  35     /  AlkPhos  82     29 Apr 2021 00:00  TPro  6.7    /  Alb  3.6    /  TBili  0.2    /  DBili  x      /  AST  27     /  ALT  38     /  AlkPhos  113    28 Apr 2021 17:40    PT/INR - ( 28 Apr 2021 17:40 )   PT: 11.8 sec;   INR: 1.01 ratio         PTT - ( 28 Apr 2021 17:40 )  PTT:29.4 sec  Cholesterol, Serum: 213 mg/dL (04-30-21 @ 08:48)  HDL Cholesterol, Serum: 52 mg/dL (04-30-21 @ 08:48)  Triglycerides, Serum: 243 mg/dL (04-30-21 @ 08:48)  Cholesterol, Serum: 194 mg/dL (04-29-21 @ 02:07)  HDL Cholesterol, Serum: 49 mg/dL (04-29-21 @ 02:07)  Triglycerides, Serum: 172 mg/dL (04-29-21 @ 02:07)    Lactate, Blood: 2.3 mmol/L (04-29-21 @ 00:00)  Lactate, Blood: 0.5 mmol/L (04-28-21 @ 17:40)    12 Lead ECG:   Ventricular Rate 84 BPM  Atrial Rate 84 BPM  P-R Interval 134 ms  QRS Duration 76 ms  Q-T Interval 396 ms  QTC Calculation(Bazett) 467 ms  P Axis 63 degrees  R Axis 58 degrees  T Axis 40 degrees  Diagnosis Line Sinus rhythm with premature atrial complexes  ST & T wave abnormality, consider lateral ischemia  Abnormal ECG  No previous ECGs available  Confirmed by ASYDA TELLES ([id number]) on 4/29/2021 5:32:39 PM (04-29-21 @ 01:57)    < from: MR Head w/wo IV Cont (04.29.21 @ 12:11) >  EXAM:  MR BRAIN WAW IC                        PROCEDURE DATE:  04/29/2021    INTERPRETATION:  MRI brain with and without contrast  History altered mental status and right-sided weakness  Contrast Gadavist 6.5 cc; 1 cc discarded  Comparison CT performed 04/29/21  Image quality is somewhat limited by motion artifact  There is no enhancing mass lesion. There is mild central volume loss and chronic microvascular ischemic change, typical for age without mass effect, cortical edema orhydrocephalus. There is no evidence of acute infarct or previous parenchymal hemorrhage. The orbital and sellar contents and cerebellar tonsils are within normal limits.  IMPRESSION:  Normal for age  < end of copied text >    < from: CT Head No Cont (04.29.21 @ 01:09) >  EXAM:  CT BRAIN                        PROCEDURE DATE:  04/29/2021    INTERPRETATION:  NONCONTRAST CT OF THE BRAIN DATED 4/29/2021..  CLINICAL INFORMATION:  Change in mental status.  TECHNIQUE: Axial CT images are obtained from the cranial vertex to the skull base without the administration of IV contrast. Images are reformatted in sagittal and coronal planes.  The study is correlated with the CT of the head from 4/28/2021.  FINDINGS:  Motion degraded exam. Contrast in the vascular system from recent contrast administration. No gross acute intra-axial or extra axial hemorrhage. No significant mass effect or shift of the midline. The basal cisterns are not effaced. There is cerebral and cerebellar volume loss with prominence of the ventricles, sulci, and cerebellar folia. There are mild chronic ischemic changes in the frontoparietal white matter. There are atherosclerotic calcifications of the intracranial carotid arteries.  The regional soft tissues and osseous structures are unremarkable. The visualized paranasal sinuses and tympanic/mastoid cavities are essentially clear.  IMPRESSION:  Motion degraded exam shows no gross acute intracranial hemorrhage or mass effect. No significant interval change comparedto the prior exam from 4/28/2021.  < end of copied text >    
Patient is a 75y old  Female who presents with a chief complaint of Altered mental status (2021 13:51)      INTERVAL /OVERNIGHT EVENTS: lethargic    MEDICATIONS  (STANDING):  aspirin Suppository 300 milliGRAM(s) Rectal daily  atorvastatin 80 milliGRAM(s) Oral at bedtime  dextrose 40% Gel 15 Gram(s) Oral once  dextrose 50% Injectable 25 Gram(s) IV Push once  dextrose 50% Injectable 12.5 Gram(s) IV Push once  dextrose 50% Injectable 25 Gram(s) IV Push once  glucagon  Injectable 1 milliGRAM(s) IntraMuscular once  heparin   Injectable 5000 Unit(s) SubCutaneous every 8 hours  hydrALAZINE Injectable 10 milliGRAM(s) IV Push once  insulin glargine Injectable (LANTUS) 35 Unit(s) SubCutaneous at bedtime  insulin lispro (ADMELOG) corrective regimen sliding scale   SubCutaneous three times a day before meals  insulin lispro (ADMELOG) corrective regimen sliding scale   SubCutaneous at bedtime  metoprolol tartrate Injectable 1.25 milliGRAM(s) IV Push every 6 hours    MEDICATIONS  (PRN):  acetaminophen  Suppository .. 650 milliGRAM(s) Rectal every 6 hours PRN Temp greater or equal to 38C (100.4F)  hydrALAZINE Injectable 10 milliGRAM(s) IV Push every 8 hours PRN SBP> 160      Allergies    morphine (Pruritus)    Intolerances        REVIEW OF SYSTEMS:  unable to obtain    Vital Signs Last 24 Hrs  T(C): 37.3 (2021 16:36), Max: 39.2 (2021 22:59)  T(F): 99.1 (2021 16:36), Max: 102.5 (2021 22:59)  HR: 80 (2021 16:36) (71 - 84)  BP: 153/95 (2021 16:36) (147/67 - 232/100)  BP(mean): --  RR: 18 (2021 16:36) (16 - 22)  SpO2: 95% (2021 16:36) (95% - 98%)    PHYSICAL EXAM:  GENERAL: NAD, well-groomed, well-developed  HEAD:  Atraumatic, Normocephalic  EYES: EOMI, PERRLA, conjunctiva and sclera clear  ENMT: No tonsillar erythema, exudates, or enlargement; Moist mucous membranes, Good dentition, No lesions  NECK: Supple, No JVD, Normal thyroid  NERVOUS SYSTEM:  lethargic; Motor Strength 5/5 B/L upper and lower extremities; DTRs 2+ intact and symmetric  CHEST/LUNG: Clear to auscultation bilaterally; No rales, rhonchi, wheezing, or rubs  HEART: Regular rate and rhythm; No murmurs, rubs, or gallops  ABDOMEN: Soft, Nontender, Nondistended; Bowel sounds present  EXTREMITIES:  2+ Peripheral Pulses, No clubbing, cyanosis, or edema  LYMPH: No lymphadenopathy noted  SKIN: No rashes or lesions    LABS:                        12.2   11.77 )-----------( 194      ( 2021 00:00 )             37.2     2021 00:00    140    |  106    |  24     ----------------------------<  247    3.8     |  25     |  1.40     Ca    8.8        2021 00:00    TPro  7.5    /  Alb  4.0    /  TBili  0.5    /  DBili  x      /  AST  27     /  ALT  35     /  AlkPhos  82     2021 00:00    PT/INR - ( 2021 17:40 )   PT: 11.8 sec;   INR: 1.01 ratio         PTT - ( 2021 17:40 )  PTT:29.4 sec  Urinalysis Basic - ( 2021 07:24 )    Color: Yellow / Appearance: Clear / S.020 / pH: x  Gluc: x / Ketone: Negative  / Bili: Negative / Urobili: Negative   Blood: x / Protein: 100 / Nitrite: Negative   Leuk Esterase: Negative / RBC: 0-2 /HPF / WBC 3-5   Sq Epi: x / Non Sq Epi: Occasional / Bacteria: Moderate      CAPILLARY BLOOD GLUCOSE      POCT Blood Glucose.: 145 mg/dL (2021 16:52)  POCT Blood Glucose.: 210 mg/dL (2021 12:30)  POCT Blood Glucose.: 183 mg/dL (2021 07:54)  POCT Blood Glucose.: 230 mg/dL (2021 05:31)  POCT Blood Glucose.: 223 mg/dL (2021 22:53)      RADIOLOGY & ADDITIONAL TESTS:    Notes Reviewed:  [x ] YES  [ ] NO    Care Discussed with Consultants/Other Providers [x ] YES  [ ] NO

## 2021-04-30 NOTE — SWALLOW BEDSIDE ASSESSMENT ADULT - SLP PERTINENT HISTORY OF CURRENT PROBLEM
Per charting, "76 y/o female with PMHx HTN, HLD, DMT2 presented to ED earlier today with AMS, aphasia witnessed by  at home, admitted for HTN urgency and AMS workup, now with new onset fever "
r/o dysphagia

## 2021-04-30 NOTE — DISCHARGE NOTE PROVIDER - NSDCMRMEDTOKEN_GEN_ALL_CORE_FT
alendronate 70 mg oral tablet: 1 tab(s) orally once a week  aspirin 325 mg oral delayed release tablet: 1 tab(s) orally once a day  Lantus Solostar Pen 100 units/mL subcutaneous solution: 34 unit(s) subcutaneous once a day    *as per spouse, pt may not be compliant on this regimen.  Lexapro 10 mg oral tablet: 1 tab(s) orally once a day  lisinopril 10 mg oral tablet: 1 tab(s) orally once a day  metFORMIN 750 mg oral tablet, extended release: 2 tab(s) orally once a day  NovoLIN 70/30 FlexPen subcutaneous suspension: 20 units subcutaneously at breakfast  15 units subcutaneously at dinner  pravastatin 40 mg oral tablet: 1 tab(s) orally once a day

## 2021-04-30 NOTE — PROGRESS NOTE ADULT - ASSESSMENT
75y Female brought to ED AMS abrupt onset since 1:30 PM  4/28 witnessed by .  Patient unable to provide any history due to confusion.   states she was having difficulty in walkingthe day PTA andthen had decreased oral intake.  Patient and  were outside working in the yard when patient became more confused.   thought it was due to blood sugar and gave her something to drink.  Patient with clear speech but difficult with word finding initially then AMS progressed to be more profound.  Not on blood thinners.  No headache no nausea or vomiting.    Recommendations  AMS is not clearly due to stroke despite clinical picture, abrupt onset, no other obvious localization, minimal leukocytosis, agree with observation off abx. and will follow results of micro workup. Supple neck so low suspicion for meningitis.     at this point pt quickly back to baseline and doing fine off abx.  Rec obs off abx.    Thank you for consulting us and involving us in the management of this most interesting and challenging case.  Please call us at [phone] or text me directly on my cell#[phone] with any concerns or further questions.

## 2021-04-30 NOTE — SPEECH LANGUAGE PATHOLOGY EVALUATION - COMMENTS
Consult received and chart reviewed. Patient seen at chairside this afternoon for a speech and language evaluation which she was alert and cooperative. The patient was also seen for a swallow evaluation, in which a regular diet with thin liquid textures was recommended (please see full report for details). The patient reports difficulty with word finding stating she "knows the word but can't say it". Patient denied difficulty with speech/language/cognition prior to admission.    As per charting, the patient is a "66 F PMH type II DM, HTN, HLD, hypothyroidism presents to ED c/o of numbness/tingling of LUE x1 day. Patient states she awoke yesterday a.m. at 6:30 noticing numbness in her L hand, primarily in the 4th and 5th digit that has yet to change in intensity or resolve. She reports hx of carpal tunnel in that hand, with release surgery 20 years prior. At work yesterday day, she noted increased clumsiness she ascribed to worsening arthritis in her L knee. Denies vision changes, difficulty swallowing or articulating, headache, dizziness, chest pain, SOB, MARTINEZ, palpitations, N, V, D, C. She states she had a similar symptom 2 weeks ago, where her L hand was numb for a few minutes but resolved. She came to the ED because her friend told her it sounded like she may have had a "stroke", and she drove herself here to [place]. Note, patient had cardiac stress test/echo 5 years prior which was WNL per pt as her mother  of MI at age 32."    WBC is WFL. The most recent CT Brain stroke revealed "Age indeterminate right thalamic lacunar infarction. No acute intracranial hemorrhage." Most recent CXR revealed "Grossly clear lungs."    Discussed results from today's evaluations with the patient, RN, and call out to MD. Speech/language therapy is recommended upon discharge from [facility]. Outpatient SLP services can be scheduled through the [facility] at [phone]. Consult received and chart reviewed. Patient seen at chairside this afternoon for a speech and language evaluation, at which time she was alert and cooperative. The patient was also seen for a clinical dysphagia evaluation (please see full report for details), at which time a regular solid with thin liquid diet was recommended. The patient reports difficulty with word finding stating she "knows the word but can't say it". Patient denied difficulty with speech/language/cognition prior to admission.    As per charting, the patient is a "66 F PMH type II DM, HTN, HLD, hypothyroidism presents to ED c/o of numbness/tingling of LUE x1 day. Patient states she awoke yesterday a.m. at 6:30 noticing numbness in her L hand, primarily in the 4th and 5th digit that has yet to change in intensity or resolve. She reports hx of carpal tunnel in that hand, with release surgery 20 years prior. At work yesterday day, she noted increased clumsiness she ascribed to worsening arthritis in her L knee. Denies vision changes, difficulty swallowing or articulating, headache, dizziness, chest pain, SOB, MARTINEZ, palpitations, N, V, D, C. She states she had a similar symptom 2 weeks ago, where her L hand was numb for a few minutes but resolved. She came to the ED because her friend told her it sounded like she may have had a "stroke", and she drove herself here to [place]. Note, patient had cardiac stress test/echo 5 years prior which was WNL per pt as her mother  of MI at age 32."    WBC is WFL. The most recent CT Brain stroke revealed "Age indeterminate right thalamic lacunar infarction. No acute intracranial hemorrhage." Most recent CXR revealed "Grossly clear lungs."    Discussed results from today's evaluations with the patient, RN, and call out to MD.

## 2021-04-30 NOTE — PROGRESS NOTE ADULT - NSICDXPROBLEMPLAN1_GEN_ALL_CORE_FT
cont lantus 35 units qhs  cont mod dose admelog scale coverage qac/qhs  to add standing pre-meal admelog pending further bg numbers  goal bg 100-180 in hosp setting  cont cons cho diet
CT Brain, Neuro consult

## 2021-04-30 NOTE — DISCHARGE NOTE PROVIDER - NSDCCPCAREPLAN_GEN_ALL_CORE_FT
PRINCIPAL DISCHARGE DIAGNOSIS  Diagnosis: AMS (altered mental status)  Assessment and Plan of Treatment: follow up with PMD Dr. JONES      SECONDARY DISCHARGE DIAGNOSES  Diagnosis: Hypertensive urgency  Assessment and Plan of Treatment:

## 2021-04-30 NOTE — SWALLOW BEDSIDE ASSESSMENT ADULT - COMMENTS
Consult received and chart reviewed. Patient seen at chairside this afternoon for an initial assessment of the swallow function which she was alert and cooperative. The patient was also seen for a speech and language evaluation, which the patient was recommended speech therapy (please see full report for details). The patient denies pain and difficulty swallowing at this time.     As per charting, the patient is a "66 F PMH type II DM, HTN, HLD, hypothyroidism presents to ED c/o of numbness/tingling of LUE x1 day. Patient states she awoke yesterday a.m. at 6:30 noticing numbness in her L hand, primarily in the 4th and 5th digit that has yet to change in intensity or resolve. She reports hx of carpal tunnel in that hand, with release surgery 20 years prior. At work yesterday day, she noted increased clumsiness she ascribed to worsening arthritis in her L knee. Denies vision changes, difficulty swallowing or articulating, headache, dizziness, chest pain, SOB, MARTINEZ, palpitations, N, V, D, C. She states she had a similar symptom 2 weeks ago, where her L hand was numb for a few minutes but resolved. She came to the ED because her friend told her it sounded like she may have had a "stroke", and she drove herself here to [place]. Note, patient had cardiac stress test/echo 5 years prior which was WNL per pt as her mother  of MI at age 32."    WBC is WFL. The most recent CT Brain stroke revealed "Age indeterminate right thalamic lacunar infarction. No acute intracranial hemorrhage." Most recent CXR revealed "Grossly clear lungs."    Discussed results from today's evaluations with the patient, RN, and call out to MD. Consult received and chart reviewed. Patient seen at chairside this afternoon for an initial assessment of the swallow function, at which time she was alert and cooperative. The patient was also seen this afternoon for a speech and language evaluation (please see full report for details). At this time, the patient is denying any pain or difficulty swallowing.    As per charting, the patient is a "66 F PMH type II DM, HTN, HLD, hypothyroidism presents to ED c/o of numbness/tingling of LUE x1 day. Patient states she awoke yesterday a.m. at 6:30 noticing numbness in her L hand, primarily in the 4th and 5th digit that has yet to change in intensity or resolve. She reports hx of carpal tunnel in that hand, with release surgery 20 years prior. At work yesterday day, she noted increased clumsiness she ascribed to worsening arthritis in her L knee. Denies vision changes, difficulty swallowing or articulating, headache, dizziness, chest pain, SOB, MARTINEZ, palpitations, N, V, D, C. She states she had a similar symptom 2 weeks ago, where her L hand was numb for a few minutes but resolved. She came to the ED because her friend told her it sounded like she may have had a "stroke", and she drove herself here to [place]. Note, patient had cardiac stress test/echo 5 years prior which was WNL per pt as her mother  of MI at age 32."    WBC is WFL. The most recent CT Brain stroke revealed "Age indeterminate right thalamic lacunar infarction. No acute intracranial hemorrhage." Most recent CXR revealed "Grossly clear lungs."    Discussed results from today's evaluations with the patient, RN, and call out to MD.

## 2021-04-30 NOTE — SPEECH LANGUAGE PATHOLOGY EVALUATION - SLP DIAGNOSIS
Informal measures were used to assess speech/language/cognition. Patient demonstrated receptive skills that were WFL. Patient responded to Y/N questions and WH- questions with 100% accuracy. Patient demonstrated appropriate responses to open-ended questions and at the conversational level. Patient complted object ID with 100% accuracy. Patient demonstrated a mild expressive aphasia. Patient appropriately stated personal information (i.e. name, , location). Patient completed confrontation naming tasks with 100% accuracy. Patient completed repetition of monosyllabic words, multisyllabic words and short phrases. Patient demonstrated mild word retrieval difficulties at the conversational level.

## 2021-04-30 NOTE — SWALLOW BEDSIDE ASSESSMENT ADULT - SWALLOW EVAL: DIAGNOSIS
1. Patient demonstrated functional oral management of puree, solid, and thin liquid textures marked by adequate bolus collection, transfer, and posterior transport. 2. The patient demonstrated a judged functional pharyngeal phase of the swallow for puree, solid, and thin liquid textures marked by a suspected timely pharyngeal swallow trigger with hyolaryngeal elevation noted upon digital palpation w/o evidence of airway penetration.

## 2021-04-30 NOTE — CHART NOTE - NSCHARTNOTEFT_GEN_A_CORE

## 2021-04-30 NOTE — SWALLOW BEDSIDE ASSESSMENT ADULT - SWALLOW EVAL: RECOMMENDED DIET
Regular diet with thin liquid textures, as tolerated Regular solid with thin liquid diet, as tolerated

## 2021-04-30 NOTE — PROGRESS NOTE ADULT - ASSESSMENT
74 y/o female with PMHx HTN, HLD, DMT2 presented to ED earlier today with AMS, aphasia witnessed by  at home, admitted for HTN urgency and and suspected CVA      HTN urgency  - On telemetry SR -  no events overnight  - BP: 164/72 (04-30-21 @ 09:40) (136/61 - 167/68) permissive HTN per neuro   - Holding lisinopril  in the setting of NORA creatinine trend:  <--1.40,  <--1.40  - Continue  Metoprolol 2.5mg IV q6h, allowing permissive HTN keeping SBP greater than 150 below 200 per neuro  - Hydralazine 10 mg IVP q8h PRN for SBP > 160   - Follow up TTE   - MRI and CT head unremarkable, follow up with neuro  - Monitor and replete lytes, keep K>4, Mg>2.  - Continue ASA WY until cleared for po    HLD   - Lipitor increased to 80 mg QD is cleared for po      - Monitor and replete lytes, keep K>4, Mg>2.  - All other medical needs as per primary team.  - Other cardiovascular workup will depend on clinical course.  - Will continue to follow.    Roxanna Martins, MS ANP, AGACNP  Nurse Practitioner- Cardiology   Spectra #[phone]

## 2021-04-30 NOTE — DISCHARGE NOTE NURSING/CASE MANAGEMENT/SOCIAL WORK - PATIENT PORTAL LINK FT
You can access the FollowMyHealth Patient Portal offered by [facility] by registering at the following website: [email or web address]. By joining [facility]’s FollowMyHealth portal, you will also be able to view your health information using other applications (apps) compatible with our system.

## 2021-04-30 NOTE — DISCHARGE NOTE PROVIDER - HOSPITAL COURSE
admitted for ams  possible tia  acute febrile illness  doubt active infection  pt for ambulation  dc after neuro clearance

## 2021-04-30 NOTE — DISCHARGE NOTE PROVIDER - CARE PROVIDER_API CALL
Joe Leslie (MD)  Internal Medicine  [address] [place] [address]  Phone: [phone]  Fax: [phone]  Follow Up Time:     Saihl Fung  NEUROLOGY  [address] [place] [address]  Phone: [phone]  Fax: [phone]  Follow Up Time:

## 2021-04-30 NOTE — PROGRESS NOTE ADULT - ATTENDING COMMENTS
Seen/examined. agree with above.   bp has been better  can change iv metoprolol to toprol xl 25 at time of dc  off lisinopril in setting of meredith  neuro follow up

## 2021-05-02 LAB — ANA TITR SER: NEGATIVE — SIGNIFICANT CHANGE UP

## 2021-05-03 LAB
CULTURE RESULTS: SIGNIFICANT CHANGE UP
CULTURE RESULTS: SIGNIFICANT CHANGE UP
SPECIMEN SOURCE: SIGNIFICANT CHANGE UP
SPECIMEN SOURCE: SIGNIFICANT CHANGE UP

## 2022-04-25 ENCOUNTER — APPOINTMENT (OUTPATIENT)
Dept: ORTHOPEDIC SURGERY | Facility: CLINIC | Age: 77
End: 2022-04-25
Payer: MEDICARE

## 2022-04-25 VITALS — BODY MASS INDEX: 23.74 KG/M2 | HEIGHT: 63 IN | WEIGHT: 134 LBS

## 2022-04-25 DIAGNOSIS — M16.12 UNILATERAL PRIMARY OSTEOARTHRITIS, LEFT HIP: ICD-10-CM

## 2022-04-25 DIAGNOSIS — Z96.641 PRESENCE OF RIGHT ARTIFICIAL HIP JOINT: ICD-10-CM

## 2022-04-25 DIAGNOSIS — M70.62 TROCHANTERIC BURSITIS, LEFT HIP: ICD-10-CM

## 2022-04-25 DIAGNOSIS — M54.16 RADICULOPATHY, LUMBAR REGION: ICD-10-CM

## 2022-04-25 PROCEDURE — 99203 OFFICE O/P NEW LOW 30 MIN: CPT

## 2022-04-25 PROCEDURE — 73502 X-RAY EXAM HIP UNI 2-3 VIEWS: CPT

## 2022-04-25 NOTE — DISCUSSION/SUMMARY
[de-identified] : The patient was advised of the diagnosis.  The natural history of the pathology was explained in full to the patient in layman's terms. All questions were answered.  The risks and benefits of surgical and non-surgical treatment alternatives were explained in full to the patient. \par The natural progression of Osteoarthritis was explained to the patient.  We discussed the possible treatment options from conservative to operative.  These included NSAIDs, Glucosamine and Chondrotin sulfate, and Physical Therapy as well different types of injections.  We also discussed that at some point they may progress to needed a SHRADDHA.  Information and pamphlets were given when appropriate. \par Patient is being referred to Physical Therapy and home exercise program. \par Progress note completed by Florence Andrews PA-C.

## 2022-04-25 NOTE — HISTORY OF PRESENT ILLNESS
[Gradual] : gradual [8] : 8 [0] : 0 [Dull/Aching] : dull/aching [Intermittent] : intermittent [de-identified] : 75yo F with bilateral L>R hip pain for the past few months with no injury. She also has some lumbar and buttock pain; known to have stenosis and past hx of sciatica. Hx of R SHRADDHA in 2012. Admits to hx of TIA last year, and has had a few falls, but none of the incidents exacerbated the pain. Voltaren gel to some relief.  [] : no [FreeTextEntry1] : KRYS Hip [FreeTextEntry3] : a few months [FreeTextEntry5] : Pt present with KRYS hip pain. Pt reported that she had hip replacement done on her Rt Hip in 12/2012. [de-identified] : non recent

## 2022-04-25 NOTE — ASSESSMENT
[FreeTextEntry1] : Reassured her that all surgical hardware is in place. Will begin with PT/HEP for left hip OA/troch bursitis, and lumbar pain; she will begin this after she has recovered from her endarterectomy. Tylenol and Voltaren prn. f/up in 6-8 weeks to re-eval

## 2022-04-25 NOTE — PHYSICAL EXAM
[Right] : right hip [Left] : left hip [Bilateral] : hip with pelvis bilaterally [No loss of surgical correlation. Bony alignment acceptable. Hardware in appropriate position] : No loss of surgical correlation. Bony alignment acceptable. Hardware in appropriate position [Normal Coordination] : normal coordination [Normal Sensation] : normal sensation [Orientated] : orientated [Normal Skin] : normal skin [Well Developed] : well developed [] : no erythema [FreeTextEntry3] : Well healed scars  [FreeTextEntry9] : Mild/mod left hip OA

## 2022-06-20 ENCOUNTER — APPOINTMENT (OUTPATIENT)
Dept: ORTHOPEDIC SURGERY | Facility: CLINIC | Age: 77
End: 2022-06-20

## 2022-07-11 ENCOUNTER — APPOINTMENT (OUTPATIENT)
Dept: ORTHOPEDIC SURGERY | Facility: CLINIC | Age: 77
End: 2022-07-11

## 2022-12-19 NOTE — PATIENT PROFILE ADULT - LONGEST PERIOD TOBACCO-FREE
5-Fu Counseling: 5-Fluorouracil Counseling:  I discussed with the patient the risks of 5-fluorouracil including but not limited to erythema, scaling, itching, weeping, crusting, and pain. 3 years

## 2022-12-30 ENCOUNTER — NON-APPOINTMENT (OUTPATIENT)
Age: 77
End: 2022-12-30

## 2022-12-30 DIAGNOSIS — R92.8 OTHER ABNORMAL AND INCONCLUSIVE FINDINGS ON DIAGNOSTIC IMAGING OF BREAST: ICD-10-CM

## 2023-01-09 ENCOUNTER — NON-APPOINTMENT (OUTPATIENT)
Age: 78
End: 2023-01-09

## 2023-10-24 NOTE — PHYSICAL THERAPY INITIAL EVALUATION ADULT - MUSCLE TONE ASSESSMENT, REHAB EVAL
I will START or STAY ON the medications listed below when I get home from the hospital:    aspirin 81 mg oral tablet  -- 1 tab(s) by mouth once a day  -- Indication: For Home med    nortriptyline 10 mg oral capsule  -- 2 cap(s) by mouth once a day  -- Indication: For Home med    doxylamine-pyridoxine 10 mg-10 mg oral delayed release tablet  -- 2 tab(s) orally  -- Indication: For Home med  
normal

## 2023-11-20 DIAGNOSIS — Z11.3 ENCOUNTER FOR SCREENING FOR INFECTIONS WITH A PREDOMINANTLY SEXUAL MODE OF TRANSMISSION: ICD-10-CM

## 2023-11-22 ENCOUNTER — APPOINTMENT (OUTPATIENT)
Dept: OBGYN | Facility: CLINIC | Age: 78
End: 2023-11-22
Payer: MEDICARE

## 2023-11-22 VITALS
SYSTOLIC BLOOD PRESSURE: 122 MMHG | HEIGHT: 63 IN | BODY MASS INDEX: 24.1 KG/M2 | OXYGEN SATURATION: 98 % | HEART RATE: 68 BPM | DIASTOLIC BLOOD PRESSURE: 50 MMHG | RESPIRATION RATE: 16 BRPM | WEIGHT: 136 LBS

## 2023-11-22 DIAGNOSIS — Z01.411 ENCOUNTER FOR GYNECOLOGICAL EXAMINATION (GENERAL) (ROUTINE) WITH ABNORMAL FINDINGS: ICD-10-CM

## 2023-11-22 DIAGNOSIS — Z12.39 ENCOUNTER FOR OTHER SCREENING FOR MALIGNANT NEOPLASM OF BREAST: ICD-10-CM

## 2023-11-22 DIAGNOSIS — M48.061 SPINAL STENOSIS, LUMBAR REGION WITHOUT NEUROGENIC CLAUDICATION: ICD-10-CM

## 2023-11-22 DIAGNOSIS — Z12.4 ENCOUNTER FOR SCREENING FOR MALIGNANT NEOPLASM OF CERVIX: ICD-10-CM

## 2023-11-22 DIAGNOSIS — T66.XXXA RADIATION SICKNESS, UNSPECIFIED, INITIAL ENCOUNTER: ICD-10-CM

## 2023-11-22 DIAGNOSIS — N89.5 STRICTURE AND ATRESIA OF VAGINA: ICD-10-CM

## 2023-11-22 DIAGNOSIS — Z80.1 FAMILY HISTORY OF MALIGNANT NEOPLASM OF TRACHEA, BRONCHUS AND LUNG: ICD-10-CM

## 2023-11-22 DIAGNOSIS — N89.8 OTHER SPECIFIED NONINFLAMMATORY DISORDERS OF VAGINA: ICD-10-CM

## 2023-11-22 DIAGNOSIS — Z86.73 PERSONAL HISTORY OF TRANSIENT ISCHEMIC ATTACK (TIA), AND CEREBRAL INFARCTION W/OUT RESIDUAL DEFICITS: ICD-10-CM

## 2023-11-22 DIAGNOSIS — E11.8 TYPE 2 DIABETES MELLITUS WITH UNSPECIFIED COMPLICATIONS: ICD-10-CM

## 2023-11-22 DIAGNOSIS — Z13.31 ENCOUNTER FOR SCREENING FOR DEPRESSION: ICD-10-CM

## 2023-11-22 DIAGNOSIS — Z85.048 PERSONAL HISTORY OF OTHER MALIGNANT NEOPLASM OF RECTUM, RECTOSIGMOID JUNCTION, AND ANUS: ICD-10-CM

## 2023-11-22 DIAGNOSIS — Z13.820 ENCOUNTER FOR SCREENING FOR OSTEOPOROSIS: ICD-10-CM

## 2023-11-22 PROCEDURE — G0444 DEPRESSION SCREEN ANNUAL: CPT

## 2023-11-22 PROCEDURE — G0101: CPT

## 2023-11-22 RX ORDER — EMPAGLIFLOZIN 25 MG/1
25 TABLET, FILM COATED ORAL
Refills: 0 | Status: ACTIVE | COMMUNITY

## 2023-11-22 RX ORDER — LEVETIRACETAM 250 MG/1
250 TABLET, FILM COATED ORAL
Refills: 0 | Status: ACTIVE | COMMUNITY

## 2023-11-22 RX ORDER — ESCITALOPRAM OXALATE 10 MG/1
10 TABLET, FILM COATED ORAL
Refills: 0 | Status: ACTIVE | COMMUNITY

## 2023-11-22 RX ORDER — LISINOPRIL 5 MG/1
5 TABLET ORAL
Refills: 0 | Status: ACTIVE | COMMUNITY

## 2023-11-22 RX ORDER — METFORMIN ER 750 MG 750 MG/1
750 TABLET ORAL
Refills: 0 | Status: ACTIVE | COMMUNITY

## 2023-11-22 RX ORDER — UBIDECARENONE/VIT E ACET 100MG-5
25 MCG CAPSULE ORAL
Refills: 0 | Status: ACTIVE | COMMUNITY

## 2023-11-22 RX ORDER — AMLODIPINE BESYLATE AND BENAZEPRIL HYDROCHLORIDE 2.5; 1 MG/1; MG/1
2.5-1 CAPSULE ORAL
Refills: 0 | Status: ACTIVE | COMMUNITY

## 2023-11-22 RX ORDER — PNV NO.95/FERROUS FUM/FOLIC AC 28MG-0.8MG
100 TABLET ORAL
Refills: 0 | Status: ACTIVE | COMMUNITY

## 2023-11-22 RX ORDER — PRAVASTATIN SODIUM 80 MG/1
TABLET ORAL
Refills: 0 | Status: ACTIVE | COMMUNITY

## 2023-11-22 RX ORDER — INSULIN GLARGINE 300 U/ML
INJECTION, SOLUTION SUBCUTANEOUS
Refills: 0 | Status: ACTIVE | COMMUNITY

## 2023-11-27 LAB
CYTOLOGY CVX/VAG DOC THIN PREP: ABNORMAL
HPV 16 E6+E7 MRNA CVX QL NAA+PROBE: NOT DETECTED
HPV18+45 E6+E7 MRNA CVX QL NAA+PROBE: NOT DETECTED
SOURCE AMPLIFICATION: NORMAL
T VAGINALIS RRNA SPEC QL NAA+PROBE: NOT DETECTED

## 2024-06-28 NOTE — ED ADULT NURSE NOTE - CADM POA PRESS ULCER
Medicare Annual Wellness Visit    Kierra Nunes is here for Medicare AWV    Assessment & Plan   Medicare annual wellness visit, subsequent  Essential hypertension  Well-controlled. Continue irbesartan 150 mg daily.  -     Comprehensive Metabolic Panel  Mixed hyperlipidemia  Controlled. Last LDL 68.8 on 2/9/2024. Continue Crestor 20 mg nightly.  Type 1 diabetes mellitus with diabetic polyneuropathy (HCC)  Controlled. Last A1c 6.9% on 2/9/24. Continue Humulin 70/30 for DM and gabapentin for neuropathy. Follow up with Dr. Mendoza as scheduled in Aug 2024.  -      DIABETES FOOT EXAM  -     Microalbumin / Creatinine Urine Ratio; Future  -     Update on current dose: insulin 70-30 (HUMULIN 70/30) (70-30) 100 UNIT per ML injection vial; Inject subcutaneously 38 units before breakfast and 17 units before dinner, Disp-50 mL, R-2NO PRINT  Mild episode of recurrent major depressive disorder (HCC)  Generally controlled on Seroquel XR 50 mg nightly. Does not sleep well every night but does not want to increase the dose due to concerns about weight gain.  Obesity (BMI 30-39.9)  She is worried that her abdomen appears large and asks about weight loss medications. I recommended she discuss this with Dr. Mendoza since the newest weight loss medications also treat diabetes.  Screening for colon cancer  Due soon for 3 yr repeat Cologuard test.  -     Cologuard (Fecal DNA Colorectal Cancer Screening)    She forgot to have labs done before this appointment and will have them done today.    Recommendations for Preventive Services Due: see orders and patient instructions/AVS.  Recommended screening schedule for the next 5-10 years is provided to the patient in written form: see Patient Instructions/AVS.     Return in about 4 months (around 10/28/2024), or if symptoms worsen or fail to improve, for HTN, weight.       Subjective     Complains of pain at back, right hip, and right leg. Had a back SELENE that helped pain for a while. Missed her  No

## 2024-11-19 DIAGNOSIS — Z12.39 ENCOUNTER FOR OTHER SCREENING FOR MALIGNANT NEOPLASM OF BREAST: ICD-10-CM

## 2024-11-19 DIAGNOSIS — Z01.411 ENCOUNTER FOR GYNECOLOGICAL EXAMINATION (GENERAL) (ROUTINE) WITH ABNORMAL FINDINGS: ICD-10-CM

## 2024-11-19 DIAGNOSIS — N89.8 OTHER SPECIFIED NONINFLAMMATORY DISORDERS OF VAGINA: ICD-10-CM

## 2024-11-19 DIAGNOSIS — Z13.820 ENCOUNTER FOR SCREENING FOR OSTEOPOROSIS: ICD-10-CM

## 2024-11-19 DIAGNOSIS — Z13.31 ENCOUNTER FOR SCREENING FOR DEPRESSION: ICD-10-CM

## 2024-11-26 ENCOUNTER — APPOINTMENT (OUTPATIENT)
Dept: OBGYN | Facility: CLINIC | Age: 79
End: 2024-11-26

## 2025-01-08 ENCOUNTER — NON-APPOINTMENT (OUTPATIENT)
Age: 80
End: 2025-01-08

## 2025-01-08 ENCOUNTER — APPOINTMENT (OUTPATIENT)
Dept: OBGYN | Facility: CLINIC | Age: 80
End: 2025-01-08
Payer: MEDICARE

## 2025-01-08 VITALS
RESPIRATION RATE: 14 BRPM | HEART RATE: 80 BPM | SYSTOLIC BLOOD PRESSURE: 138 MMHG | OXYGEN SATURATION: 94 % | WEIGHT: 139 LBS | HEIGHT: 63 IN | DIASTOLIC BLOOD PRESSURE: 50 MMHG | BODY MASS INDEX: 24.63 KG/M2

## 2025-01-08 DIAGNOSIS — N89.5 STRICTURE AND ATRESIA OF VAGINA: ICD-10-CM

## 2025-01-08 DIAGNOSIS — Z01.411 ENCOUNTER FOR GYNECOLOGICAL EXAMINATION (GENERAL) (ROUTINE) WITH ABNORMAL FINDINGS: ICD-10-CM

## 2025-01-08 DIAGNOSIS — E11.8 TYPE 2 DIABETES MELLITUS WITH UNSPECIFIED COMPLICATIONS: ICD-10-CM

## 2025-01-08 DIAGNOSIS — R92.8 OTHER ABNORMAL AND INCONCLUSIVE FINDINGS ON DIAGNOSTIC IMAGING OF BREAST: ICD-10-CM

## 2025-01-08 DIAGNOSIS — M48.061 SPINAL STENOSIS, LUMBAR REGION WITHOUT NEUROGENIC CLAUDICATION: ICD-10-CM

## 2025-01-08 DIAGNOSIS — Z13.820 ENCOUNTER FOR SCREENING FOR OSTEOPOROSIS: ICD-10-CM

## 2025-01-08 DIAGNOSIS — K64.4 RESIDUAL HEMORRHOIDAL SKIN TAGS: ICD-10-CM

## 2025-01-08 DIAGNOSIS — Z85.048 PERSONAL HISTORY OF OTHER MALIGNANT NEOPLASM OF RECTUM, RECTOSIGMOID JUNCTION, AND ANUS: ICD-10-CM

## 2025-01-08 DIAGNOSIS — Z12.39 ENCOUNTER FOR OTHER SCREENING FOR MALIGNANT NEOPLASM OF BREAST: ICD-10-CM

## 2025-01-08 DIAGNOSIS — Z12.4 ENCOUNTER FOR SCREENING FOR MALIGNANT NEOPLASM OF CERVIX: ICD-10-CM

## 2025-01-08 DIAGNOSIS — T66.XXXA RADIATION SICKNESS, UNSPECIFIED, INITIAL ENCOUNTER: ICD-10-CM

## 2025-01-08 DIAGNOSIS — Z13.31 ENCOUNTER FOR SCREENING FOR DEPRESSION: ICD-10-CM

## 2025-01-08 DIAGNOSIS — N89.8 OTHER SPECIFIED NONINFLAMMATORY DISORDERS OF VAGINA: ICD-10-CM

## 2025-01-08 PROCEDURE — G0101: CPT

## 2025-01-08 PROCEDURE — G0444 DEPRESSION SCREEN ANNUAL: CPT

## 2025-01-08 RX ORDER — HYDROCORTISONE 25 MG/G
2.5 CREAM TOPICAL
Qty: 1 | Refills: 1 | Status: ACTIVE | COMMUNITY
Start: 2025-01-08 | End: 1900-01-01